# Patient Record
Sex: MALE | Race: ASIAN | HISPANIC OR LATINO | URBAN - METROPOLITAN AREA
[De-identification: names, ages, dates, MRNs, and addresses within clinical notes are randomized per-mention and may not be internally consistent; named-entity substitution may affect disease eponyms.]

---

## 2019-10-01 ENCOUNTER — EMERGENCY (EMERGENCY)
Facility: HOSPITAL | Age: 44
LOS: 1 days | Discharge: HOME | End: 2019-10-01
Attending: EMERGENCY MEDICINE | Admitting: EMERGENCY MEDICINE
Payer: COMMERCIAL

## 2019-10-01 VITALS
OXYGEN SATURATION: 99 % | TEMPERATURE: 98 F | HEART RATE: 68 BPM | DIASTOLIC BLOOD PRESSURE: 96 MMHG | RESPIRATION RATE: 18 BRPM | SYSTOLIC BLOOD PRESSURE: 148 MMHG

## 2019-10-01 PROCEDURE — 99283 EMERGENCY DEPT VISIT LOW MDM: CPT

## 2019-10-01 NOTE — ED PROVIDER NOTE - NSFOLLOWUPINSTRUCTIONS_ED_ALL_ED_FT
PLEASE FOLLOW UP WITH YOUR PRIMARY CARE PHYSICIAN    RETURN TO THE EMERGENCY ROOM FOR NEW OR WORSENING SYMPTOMS

## 2019-10-01 NOTE — ED ADULT NURSE NOTE - CHPI ED NUR SYMPTOMS NEG
no diarrhea/no abdominal distension/no fever/no vomiting/no nausea/no blood in stool/no burning urination/no chills/no hematuria/no dysuria

## 2019-10-01 NOTE — ED PROVIDER NOTE - ATTENDING CONTRIBUTION TO CARE
44M p/w sudden onset L flank pain x 15 mins that resolved.  No vomiting, fever, dysuria, or hematuria.  No testicular pain or lesions reported. No LOC.  Pain now gone, pt feeling normal.  Normal exam and VS.  Likely a passed kidney stone, unlikely UTI with that presentation, possibly GERD or transient bowel problem as pt reports had eaten chili and felt it burn after defecation prior to the event.  Offered pt to have UA, pt declined after understanding it would not likely change mgmt.  pt reports he can f/u PMD tomorrow.  Advised to drink more fluids and ibuprofen for pain.   Return to Emergency Department for new or worsening symptoms.    VS:  unremarkable    GEN - NAD; well appearing; A+O x3   HEAD - NC/AT     ENT - PEERL, EOMI, mucous membranes  moist , no discharge      NECK: Neck supple, non-tender without lymphadenopathy, no masses, no JVD  PULM - CTA b/l,  symmetric breath sounds  COR -  normal heart sounds    ABD - , ND, NT, soft,  BACK - no CVA tenderness, nontender spine     EXTREMS - no edema, no deformity, warm and well perfused    SKIN - no rash or bruising      NEUROLOGIC - alert, CN 2-12 intact, sensation nl, motor no focal deficit.

## 2019-10-01 NOTE — ED ADULT TRIAGE NOTE - CHIEF COMPLAINT QUOTE
Pt. reports he was working on his water heater and after c/o LLQ pain radiating to left flank, lasting for about 15 minutes and resolved on its' own. Denies n/v/d, fever, chills, dysuria, chest pain. Resting comfortably at present.

## 2019-10-01 NOTE — ED PROVIDER NOTE - CLINICAL SUMMARY MEDICAL DECISION MAKING FREE TEXT BOX
Pt PMhx HLD, p/w episode of LLQ pain, began when pt was working on water heater in home, pt describes sharp stabbing pain in LLQ, felt better after pt laid on stomach, now completely resolved, no pain at rest or exertion or on palpation. No associated n/v/d, no recent f/c. Ddx gas pain, possible pinched hernia that has since retracted, passed kidney stone. Given benign exam and no symptoms, no indication for labs or imaging at this time. Pt stable for dc and reassurance -Zenon

## 2019-10-01 NOTE — ED PROVIDER NOTE - OBJECTIVE STATEMENT
44y m PMhx HLD pw episode of LLQ pain. Pt describes acute onset of LLQ pain that began while he was working on his water heater. It was very severe, but he said he laid on his stomach and this helped with his pain. All pain resolved w/in 15 min. Pt now asymptomatic. No associated f/c/n/v/d. No hx of similar attacks in past. Pt appears comfortable now.

## 2019-10-01 NOTE — ED PROVIDER NOTE - PATIENT PORTAL LINK FT
You can access the FollowMyHealth Patient Portal offered by [facility] by registering at the following website: [email or web address]. By joining [employer]’s FollowMyHealth portal, you will also be able to view your health information using other applications (apps) compatible with our system.

## 2022-02-07 ENCOUNTER — EMERGENCY (EMERGENCY)
Facility: HOSPITAL | Age: 47
LOS: 1 days | Discharge: HOME | End: 2022-02-07
Attending: EMERGENCY MEDICINE | Admitting: EMERGENCY MEDICINE
Payer: COMMERCIAL

## 2022-02-07 VITALS
TEMPERATURE: 98 F | DIASTOLIC BLOOD PRESSURE: 52 MMHG | SYSTOLIC BLOOD PRESSURE: 110 MMHG | RESPIRATION RATE: 20 BRPM | HEART RATE: 84 BPM | OXYGEN SATURATION: 100 %

## 2022-02-07 PROCEDURE — 93010 ELECTROCARDIOGRAM REPORT: CPT

## 2022-02-07 PROCEDURE — 99285 EMERGENCY DEPT VISIT HI MDM: CPT | Mod: 25

## 2022-02-07 NOTE — ED ADULT TRIAGE NOTE - CHIEF COMPLAINT QUOTE
Pt with abd pain, L sided CP, nausea, SOB, with radiation to back since 2215 tonight. Pt appears uncomfortable in triage. Denies PMHx.

## 2022-02-08 VITALS
DIASTOLIC BLOOD PRESSURE: 83 MMHG | RESPIRATION RATE: 18 BRPM | SYSTOLIC BLOOD PRESSURE: 115 MMHG | OXYGEN SATURATION: 100 % | TEMPERATURE: 98 F | HEART RATE: 63 BPM

## 2022-02-08 LAB
ALBUMIN SERPL ELPH-MCNC: 4.4 G/DL — SIGNIFICANT CHANGE UP (ref 3.3–5)
ALP SERPL-CCNC: 72 U/L — SIGNIFICANT CHANGE UP (ref 40–120)
ALT FLD-CCNC: 58 U/L — HIGH (ref 4–41)
ANION GAP SERPL CALC-SCNC: 12 MMOL/L — SIGNIFICANT CHANGE UP (ref 7–14)
ANION GAP SERPL CALC-SCNC: 14 MMOL/L — SIGNIFICANT CHANGE UP (ref 7–14)
APTT BLD: 36.6 SEC — HIGH (ref 27–36.3)
AST SERPL-CCNC: 97 U/L — HIGH (ref 4–40)
BILIRUB SERPL-MCNC: 0.6 MG/DL — SIGNIFICANT CHANGE UP (ref 0.2–1.2)
BLD GP AB SCN SERPL QL: NEGATIVE — SIGNIFICANT CHANGE UP
BUN SERPL-MCNC: 18 MG/DL — SIGNIFICANT CHANGE UP (ref 7–23)
BUN SERPL-MCNC: 21 MG/DL — SIGNIFICANT CHANGE UP (ref 7–23)
CALCIUM SERPL-MCNC: 8.6 MG/DL — SIGNIFICANT CHANGE UP (ref 8.4–10.5)
CALCIUM SERPL-MCNC: 9.2 MG/DL — SIGNIFICANT CHANGE UP (ref 8.4–10.5)
CHLORIDE SERPL-SCNC: 100 MMOL/L — SIGNIFICANT CHANGE UP (ref 98–107)
CHLORIDE SERPL-SCNC: 105 MMOL/L — SIGNIFICANT CHANGE UP (ref 98–107)
CO2 SERPL-SCNC: 22 MMOL/L — SIGNIFICANT CHANGE UP (ref 22–31)
CO2 SERPL-SCNC: 23 MMOL/L — SIGNIFICANT CHANGE UP (ref 22–31)
CREAT SERPL-MCNC: 1.02 MG/DL — SIGNIFICANT CHANGE UP (ref 0.5–1.3)
CREAT SERPL-MCNC: 1.12 MG/DL — SIGNIFICANT CHANGE UP (ref 0.5–1.3)
GLUCOSE SERPL-MCNC: 119 MG/DL — HIGH (ref 70–99)
GLUCOSE SERPL-MCNC: 136 MG/DL — HIGH (ref 70–99)
HCT VFR BLD CALC: 43.7 % — SIGNIFICANT CHANGE UP (ref 39–50)
HGB BLD-MCNC: 15.7 G/DL — SIGNIFICANT CHANGE UP (ref 13–17)
INR BLD: 1.05 RATIO — SIGNIFICANT CHANGE UP (ref 0.88–1.16)
LIDOCAIN IGE QN: 56 U/L — SIGNIFICANT CHANGE UP (ref 7–60)
MAGNESIUM SERPL-MCNC: 2.1 MG/DL — SIGNIFICANT CHANGE UP (ref 1.6–2.6)
MCHC RBC-ENTMCNC: 32.5 PG — SIGNIFICANT CHANGE UP (ref 27–34)
MCHC RBC-ENTMCNC: 35.9 GM/DL — SIGNIFICANT CHANGE UP (ref 32–36)
MCV RBC AUTO: 90.5 FL — SIGNIFICANT CHANGE UP (ref 80–100)
NRBC # BLD: 0 /100 WBCS — SIGNIFICANT CHANGE UP
NRBC # FLD: 0 K/UL — SIGNIFICANT CHANGE UP
PHOSPHATE SERPL-MCNC: 3.2 MG/DL — SIGNIFICANT CHANGE UP (ref 2.5–4.5)
PLATELET # BLD AUTO: 326 K/UL — SIGNIFICANT CHANGE UP (ref 150–400)
POTASSIUM SERPL-MCNC: 4.1 MMOL/L — SIGNIFICANT CHANGE UP (ref 3.5–5.3)
POTASSIUM SERPL-MCNC: 5.5 MMOL/L — HIGH (ref 3.5–5.3)
POTASSIUM SERPL-SCNC: 4.1 MMOL/L — SIGNIFICANT CHANGE UP (ref 3.5–5.3)
POTASSIUM SERPL-SCNC: 5.5 MMOL/L — HIGH (ref 3.5–5.3)
PROT SERPL-MCNC: 7.6 G/DL — SIGNIFICANT CHANGE UP (ref 6–8.3)
PROTHROM AB SERPL-ACNC: 11.9 SEC — SIGNIFICANT CHANGE UP (ref 10.6–13.6)
RBC # BLD: 4.83 M/UL — SIGNIFICANT CHANGE UP (ref 4.2–5.8)
RBC # FLD: 12.1 % — SIGNIFICANT CHANGE UP (ref 10.3–14.5)
RH IG SCN BLD-IMP: POSITIVE — SIGNIFICANT CHANGE UP
SARS-COV-2 RNA SPEC QL NAA+PROBE: SIGNIFICANT CHANGE UP
SODIUM SERPL-SCNC: 137 MMOL/L — SIGNIFICANT CHANGE UP (ref 135–145)
SODIUM SERPL-SCNC: 139 MMOL/L — SIGNIFICANT CHANGE UP (ref 135–145)
TROPONIN T, HIGH SENSITIVITY RESULT: <6 NG/L — SIGNIFICANT CHANGE UP
WBC # BLD: 19.1 K/UL — HIGH (ref 3.8–10.5)
WBC # FLD AUTO: 19.1 K/UL — HIGH (ref 3.8–10.5)

## 2022-02-08 PROCEDURE — 74177 CT ABD & PELVIS W/CONTRAST: CPT | Mod: 26,ME

## 2022-02-08 PROCEDURE — 99220: CPT | Mod: 25

## 2022-02-08 PROCEDURE — 99283 EMERGENCY DEPT VISIT LOW MDM: CPT | Mod: GC

## 2022-02-08 PROCEDURE — G1004: CPT

## 2022-02-08 PROCEDURE — 76705 ECHO EXAM OF ABDOMEN: CPT | Mod: 26

## 2022-02-08 PROCEDURE — 78226 HEPATOBILIARY SYSTEM IMAGING: CPT | Mod: 26,GC,MA

## 2022-02-08 PROCEDURE — 71045 X-RAY EXAM CHEST 1 VIEW: CPT | Mod: 26

## 2022-02-08 RX ORDER — FAMOTIDINE 10 MG/ML
20 INJECTION INTRAVENOUS DAILY
Refills: 0 | Status: DISCONTINUED | OUTPATIENT
Start: 2022-02-08 | End: 2022-02-08

## 2022-02-08 RX ORDER — FAMOTIDINE 10 MG/ML
20 INJECTION INTRAVENOUS ONCE
Refills: 0 | Status: COMPLETED | OUTPATIENT
Start: 2022-02-08 | End: 2022-02-08

## 2022-02-08 RX ORDER — MORPHINE SULFATE 50 MG/1
4 CAPSULE, EXTENDED RELEASE ORAL ONCE
Refills: 0 | Status: DISCONTINUED | OUTPATIENT
Start: 2022-02-07 | End: 2022-02-07

## 2022-02-08 RX ORDER — SODIUM CHLORIDE 9 MG/ML
1000 INJECTION INTRAMUSCULAR; INTRAVENOUS; SUBCUTANEOUS
Refills: 0 | Status: DISCONTINUED | OUTPATIENT
Start: 2022-02-08 | End: 2022-02-11

## 2022-02-08 RX ORDER — SODIUM CHLORIDE 9 MG/ML
1000 INJECTION INTRAMUSCULAR; INTRAVENOUS; SUBCUTANEOUS ONCE
Refills: 0 | Status: COMPLETED | OUTPATIENT
Start: 2022-02-08 | End: 2022-02-08

## 2022-02-08 RX ADMIN — Medication 30 MILLILITER(S): at 00:53

## 2022-02-08 RX ADMIN — FAMOTIDINE 20 MILLIGRAM(S): 10 INJECTION INTRAVENOUS at 00:53

## 2022-02-08 RX ADMIN — MORPHINE SULFATE 4 MILLIGRAM(S): 50 CAPSULE, EXTENDED RELEASE ORAL at 00:31

## 2022-02-08 RX ADMIN — SODIUM CHLORIDE 1000 MILLILITER(S): 9 INJECTION INTRAMUSCULAR; INTRAVENOUS; SUBCUTANEOUS at 00:53

## 2022-02-08 RX ADMIN — SODIUM CHLORIDE 125 MILLILITER(S): 9 INJECTION INTRAMUSCULAR; INTRAVENOUS; SUBCUTANEOUS at 12:10

## 2022-02-08 NOTE — ED CDU PROVIDER INITIAL DAY NOTE - MEDICAL DECISION MAKING DETAILS
-HIDA scan r/o acute cholecystitis  -PO challenge to r/o symptomatic gallstones after HIDA  -Gastritis continue H2 blockers upon discharge and will need GI f/u

## 2022-02-08 NOTE — ED PROVIDER NOTE - PROGRESS NOTE DETAILS
Pain resolved. Abdomen soft NTND. JACKIE Radha - surg consulted for possible acute chata. ct scan preformed. radiology recc HIDA scan if clinical concern for chata. cdu contacted for placement in OBS for HIDA scan. Awaiting CDU to see the patient. pt is pain free with non tender soft abdomen.

## 2022-02-08 NOTE — ED CDU PROVIDER DISPOSITION NOTE - CLINICAL COURSE
46 year old male with PMH of HLD presented to the ED with epigastric pain since last night. Denies associated nausea, vomiting, melena, hematochezia, diarrhea, constipation, chest pain, dyspnea, fevers and chills. On exam, abdomen soft and tender to epigastric and RUQ, no rebound. In ED, HD stable, labs reviewed, WBC 19k, Trop <6 x2, lipase 56, Bili 0.6. CT shows "Partially contracted gallbladder containing gallstones. Gallbladder wall thickening or edema, difficult to evaluate secondary to partial decompression". RUQ sono shows "Cholelithiasis and adenomyomatosis. No sonographic evidence of acute cholecystitis". Pt was seen and evaluated by Surgery, recs for HIDA scan. HIDA scan shows "Normal hepatobiliary scan. No radionuclide evidence of acute cholecystitis." Pt reassessed, states he feels better. Abdomen soft nontender. Pt ate and tolerated oral intake well. Seen by surgery and no acute surgical intervention is recommended. Pt to follow up as outpatient GI and PMD. Strict return precautions.

## 2022-02-08 NOTE — ED PROVIDER NOTE - PHYSICAL EXAMINATION
CONSTITUTIONAL: appears uncomfortable  HEENT: head atraumatic; normal cephalic shape; no conjunctivitis or scleral icterus; EOMI; neck supple w/ FROM  CARDIAC: regular rate & rhythm; normal S1, S2; no murmurs, rubs or gallops.  RESPIRATORY: breath sounds clear to auscultation bilaterally; no distress present, no crackles, wheezes, rales, rhonchi, retractions, or tachypnea; normal rate and effort.  GASTROINTESTINAL: abd diffusely tender to palpation in all four quadrants; no guarding or rigidity; no organomegaly or masses; no HSM appreciated; normoactive bowel sounds.  SKIN: cap refill brisk; skin warm, dry and intact; no evidence of rash.  BACK: no vertebral or paraspinal tenderness along entire spine; no CVAT.  MSK: no joint effusion or tenderness; FROM of all joints; no deformities or erythema noted; 2+ peripheral pulses.  NEURO: alert; interactive; no focal deficits.

## 2022-02-08 NOTE — ED PROVIDER NOTE - ATTENDING CONTRIBUTION TO CARE
Afebrile. Awake and Alert. Lungs CTA. Heart RRR. Abdomen soft, non-focal TTP, ND. CN II-XII grossly intact. Moves all extremities without lateralization.    h/o gallstones  r/o acute chata  r/o pancreatitis

## 2022-02-08 NOTE — ED PROVIDER NOTE - CLINICAL SUMMARY MEDICAL DECISION MAKING FREE TEXT BOX
47 yo M hx of HLD p/w acute onset abd pain i/s/o recent dx of GB stones and treated with 10 day course amoxicillin. Ddx including acute chata vs pancreatitis vs dissection. Initial plan for us abd.

## 2022-02-08 NOTE — CONSULT NOTE ADULT - ASSESSMENT
46M w hx of PUD, recently dx cholelithiasis, presents with epigastric pain x 1 day, with US showing cholelithiasis, no acute cholecystitis    Recommendation:  - Patient clinical presentation and US finding less consistent with acute cholecystitis  - follow up CT to evaluate for source of leukocytosis  - recommend GI consult for evaluation for gastritis, PUD  - final plan pending above  - discussed with attending Dr. Evelio MIN team surgery  [id number]

## 2022-02-08 NOTE — ED ADULT NURSE NOTE - NSICDXPASTMEDICALHX_GEN_ALL_CORE_FT
PAST MEDICAL HISTORY:  Gallstones 1/2022 treated with amoxicillin 10 days at [facility] (hyperlipidemia)     Peptic ulcer disease

## 2022-02-08 NOTE — ED ADULT NURSE REASSESSMENT NOTE - NS ED NURSE REASSESS COMMENT FT1
report taken from [facility]. pt resting comfortably in bed, denies complaints at this time. pt transported to [facility] at this time, in NAD, respirations even and unlabored. awaiting return.

## 2022-02-08 NOTE — ED CDU PROVIDER INITIAL DAY NOTE - NSICDXPASTMEDICALHX_GEN_ALL_CORE_FT
PAST MEDICAL HISTORY:  Gallstones 1/2022 treated with amoxicillin 10 days at [facility] (hyperlipidemia)     Peptic ulcer disease

## 2022-02-08 NOTE — ED PROVIDER NOTE - NSICDXFAMILYHX_GEN_ALL_CORE_FT
FAMILY HISTORY:  Family history of diabetes mellitus (DM)  FH: HTN (hypertension)  FH: hyperlipidemia

## 2022-02-08 NOTE — ED PROVIDER NOTE - OBJECTIVE STATEMENT
Pt is a 47 yo man with hx of HLD presenting with severe abdominal/chest pain for several hours. Pt reports that pain started around 22:15 this evening after dinner which started in the back and then became 10/10 diffuse burning pain throughout abd and chest. Endorses nausea during episode. Last BM was after dinner; states it was dark, formed stool. Also states that 2 weeks ago went to [facility] for abd pain, found to have gallstones, sent home with 10 day course of amoxicillin. Denies SOB, vomiting, diarrhea, constipation.

## 2022-02-08 NOTE — ED CDU PROVIDER INITIAL DAY NOTE - OBJECTIVE STATEMENT
Initial ED provider note: "Pt is a 47 yo man with hx of HLD presenting with severe abdominal/chest pain for several hours. Pt reports that pain started around 22:15 this evening after dinner which started in the back and then became 10/10 diffuse burning pain throughout abd and chest. Endorses nausea during episode. Last BM was after dinner; states it was dark, formed stool. Also states that 2 weeks ago went to [facility] for abd pain, found to have gallstones, sent home with 10 day course of amoxicillin. Denies SOB, vomiting, diarrhea, constipation."    Agree with above. 46 year old male with PMH of HLD presented to the ED with epigastric pain since last night. Denies associated nausea, vomiting, melena, hematochezia, diarrhea, constipation, chest pain, dyspnea, fevers and chills. On exam, abdomen soft and tender to epigastric and RUQ, no rebound. In ED, HD stable, labs reviewed, WBC 19k, Trop <6 x2, lipase 56, Bili 0.6. CT shows "Partially contracted gallbladder containing gallstones. Gallbladder wall thickening or edema, difficult to evaluate secondary to partial decompression". RUQ sono shows "Cholelithiasis and adenomyomatosis. No sonographic evidence of acute cholecystitis". Pt was seen and evaluated by Surgery, recs for HIDA scan. CDU plan for HIDA scan, analgesics, IV hydration, f/u surg consult, reassess.

## 2022-02-08 NOTE — ED ADULT NURSE NOTE - OBJECTIVE STATEMENT
47 y/o, a&ox4, received to rm 9 with c/o pain. Pt reports 10/10, constant pain located to abdomen, chest, and back region. Pt reports recent diagnosis of gallstones two weeks ago, no interventions at that time, however, pain started while eating dinner last night. 20G IV placed to posterior left forearm, labs collected and sent off. Pt reports pain relief after receiving medication. Respirations are even and unlabored, no signs of respiratory distress.

## 2022-02-08 NOTE — CONSULT NOTE ADULT - SUBJECTIVE AND OBJECTIVE BOX
GENERAL SURGERY CONSULT NOTE    Patient is a 46y old  Male who presents with a chief complaint of abdominal pain    HPI:  46 year old male w hx of peptic ulcer disease, recently dx cholelithiasis, presents with epigastric pain x 1. Reports having chicken 1 hour prior, no new food. Patient reports burning sensation initially in b/l mid abdomen, and later epigastric and chest. Denies RUQ pain. Reports burning pain similar to previous episode of peptic ulcer diagnosed on upper endoscopy 3 years ago, prescribed ?Nexium. Of note, patient diagnosed with cholelithiasis at [facility] 2 weeks ago after abdominal pain, also found to have leukocytosis of 12. He was discharged on Amoxicillin for 10days. Pain this time was different from pain 2 weeks ago, more similar to peptic ulcer episode. Patient reports using NSAID only 2 times in the past few days. Unsure if he was diagnosed with H. pylori before.     In ED, patient hemodynamically stable. Labs significant for leukocytosis 19. US shows cholelithiasis and adenomyomatosis, no acute cholecystitis. Given morphine, maalox and pepcid in ED, reports improvement with burning sensation.    10-points review of system performed with pertinent negative and positive findings documented in the HPI     PAST MEDICAL & SURGICAL HISTORY:  HLD (hyperlipidemia)    Gallstones  1/2022 treated with amoxicillin 10 days at [facility]    Peptic ulcer disease    No significant past surgical history    FAMILY HISTORY:  Family history of diabetes mellitus (DM)    FH: HTN (hypertension)    FH: hyperlipidemia    : Family history not pertinent as reviewed with the patient and family    SOCIAL HISTORY: No pertinent social history    MEDICATIONS  (STANDING):    MEDICATIONS  (PRN):  aluminum hydroxide/magnesium hydroxide/simethicone Suspension 30 milliLiter(s) Oral every 4 hours PRN Dyspepsia    Allergies    No Known Allergies    Intolerances    Vital Signs Last 24 Hrs  T(C): 36.7 (08 Feb 2022 04:02), Max: 36.7 (07 Feb 2022 23:05)  T(F): 98.1 (08 Feb 2022 04:02), Max: 98.1 (07 Feb 2022 23:05)  HR: 90 (08 Feb 2022 04:02) (84 - 99)  BP: 141/90 (08 Feb 2022 04:02) (110/52 - 141/90)  BP(mean): --  RR: 18 (08 Feb 2022 04:02) (18 - 20)  SpO2: 99% (08 Feb 2022 04:02) (99% - 100%)  Daily     Daily     Exam:  General: resting comfortably, NAD  Resp: nonlabored breathing  Abd: soft, NT, ND; no R/G  Ext: WWP  Neuro: AAOx3                        15.7   19.10 )-----------( 326      ( 08 Feb 2022 00:42 )             43.7     02-08    139  |  105  |  18  ----------------------------<  119<H>  4.1   |  22  |  1.02    Ca    8.6      08 Feb 2022 03:12  Phos  3.2     02-08  Mg     2.10     02-08    TPro  7.6  /  Alb  4.4  /  TBili  0.6  /  DBili  x   /  AST  97<H>  /  ALT  58<H>  /  AlkPhos  72  02-08    PT/INR - ( 08 Feb 2022 01:15 )   PT: 11.9 sec;   INR: 1.05 ratio         PTT - ( 08 Feb 2022 01:15 )  PTT:36.6 sec      IMAGING STUDIES:    < from: US Abdomen Upper Quadrant Right (02.08.22 @ 01:59) >  FINDINGS:    Liver: Within normal limits.  Bile ducts: Normal caliber. Common bile duct measures 3 mm.  Gallbladder: Cholelithiasis. Punctate echogenic foci along the dependent   gallbladder wall showing comet artifact posteriorly. The gallbladder wall   is mildly thickened to 4 mm. No pericholecystic fluid. Negative   sonographic Royal sign (pain medications were administered).  Pancreas: Visualized portions are within normal limits.  Right kidney: 11.1 cm. No hydronephrosis.  IVC: Visualized portions are within normal limits.    IMPRESSION:    Cholelithiasis and adenomyomatosis.  No sonographic evidence of acute cholecystitis.    < end of copied text >

## 2022-02-08 NOTE — CHART NOTE - NSCHARTNOTEFT_GEN_A_CORE
Results of HIDA scan below noted. No surgical intervention indicated given no evidence of acute cholecystitis. Please re-page surgery if clinical condition changes    Carolyn Abernathy MD  PGY2 Consult Resident  B Team Surgery (Acute Care Surgery)  [id number]        < from: NM Hepatobiliary Imaging (02.08.22 @ 10:55) >      FINDINGS: There is prompt, homogeneous uptake of radiopharmaceutical by   the hepatocytes. Activity is first seen in the gallbladder at about 20   minutes and in the bowel at about 75 minutes. There is good clearance of   activity from the liver by the end of the study.    IMPRESSION: Normal hepatobiliary scan. No radionuclide evidence of acute   cholecystitis.    --- End of Report ---    < end of copied text >

## 2022-02-08 NOTE — ED PROVIDER NOTE - NSICDXPASTMEDICALHX_GEN_ALL_CORE_FT
PAST MEDICAL HISTORY:  Gallstones 1/2022 treated with amoxicillin 10 days at [facility] (hyperlipidemia)      PAST MEDICAL HISTORY:  Gallstones 1/2022 treated with amoxicillin 10 days at [facility] (hyperlipidemia)     Peptic ulcer disease

## 2022-02-08 NOTE — ED CDU PROVIDER INITIAL DAY NOTE - PROGRESS NOTE DETAILS
HIDA scan shows "Normal hepatobiliary scan. No radionuclide evidence of acute cholecystitis." Pt reassessed, states he feels better. Abdomen soft nontender. Pt ate and tolerated oral intake well. Seen by surgery and no acute surgical intervention is recommended. Pt to follow up as outpatient GI and PMD. Strict return precautions.

## 2022-02-08 NOTE — ED CDU PROVIDER DISPOSITION NOTE - NSFOLLOWUPINSTRUCTIONS_ED_ALL_ED_FT
Follow up with your PMD within 24-48 hrs. Show copies of your reports given to you. Take all of your medications as previously prescribed.    If you have any new, worsened or concerning symptoms, please return to the emergency department immediately.    See referral attached for follow up with gastroenterology as soon as possible.       Gallstones    WHAT YOU NEED TO KNOW:    What are gallstones? Gallstones are hard substances that form in your gallbladder or bile duct. Your gallbladder and bile duct are located on the right side of your abdomen, near your liver. Your gallbladder stores bile. Bile helps break down the fat that you eat. Your gallbladder also helps remove certain chemicals from your body.      What causes gallstones? Gallstones develop when your gallbladder does not empty correctly. Stones can form from different bile materials. The following may increase your risk:   •Obesity or not enough physical activity      •Pregnancy      •A family history of gallstones      •A health condition such as diabetes, cirrhosis, or nonalcoholic fatty liver disease      •Rapid weight loss      •Surgery on your intestines      •Certain medicines, such as estrogen, antibiotics, and cholesterol-lowering medicines      What are the signs and symptoms of gallstones? The most common symptom is severe, constant pain in the right upper abdomen. It is usually just below the ribcage. The pain may also be felt in the right shoulder and between the shoulder blades. You may also have any of the following:   •Nausea and vomiting      •Feeling bloated      •Ziggy-colored bowel movements      •Dark urine      How are gallstones diagnosed? Ultrasound pictures may be used to check for gallstones. If these tests do not show clear signs of gallstones, you may need a test that uses contrast liquid. Examples include a gallbladder scan, endoscopic retrograde cholangiopancreatography (ERCP), and an oral cholecystography. For any of these tests, tell your healthcare provider if you have ever had an allergic reaction to contrast liquid. If you are a woman, tell your healthcare provider if there is a chance you may be pregnant.    How are gallstones treated? You may not need treatment if you do not have signs or symptoms. Your healthcare provider may want to monitor the gallstones over time. You may need any of the following:   •Antinausea medicine may be given to calm your stomach and to help prevent vomiting.      •Prescription pain medicine may be given. Ask your healthcare provider how to take this medicine safely. Some prescription pain medicines contain acetaminophen. Do not take other medicines that contain acetaminophen without talking to your healthcare provider. Too much acetaminophen may cause liver damage. Prescription pain medicine may cause constipation. Ask your healthcare provider how to prevent or treat constipation.       •Surgery may be needed to remove your gallbladder. This may be done after symptoms become severe or you develop health problems from the gallstones. Your healthcare provider may recommend gallbladder removal before you develop symptoms. This may be done if you are at high risk for gallstones or health problems they can cause.      What can I do to manage or prevent gallstones?   •Eat a variety of healthy foods. This may help you have more energy and heal faster. Healthy foods include fruits, vegetables, whole-grain breads, low-fat dairy products, beans, lean meat, and fish. Ask if you need to be on a special diet. Try to eat regular meals during the day. This will help your gallbladder empty.      •Exercise as directed. Talk to your healthcare provider about the best exercise plan for you. Exercise can help you lose weight and improve your health.      •Manage your weight. If you are overweight, it is important to reach a healthy weight. You will need to lose weight slowly because rapid weight loss can increase your risk for gallstones. Talk to your healthcare provider about your weight. He or she can help you create a safe weight loss plan if you need to lose weight.      When should I seek immediate care?   •You have a fever and chills.      •Your eyes or skin turn yellow.      •You have severe pain in your upper abdomen, just below the right ribcage.      When should I contact my healthcare provider?   •You have nausea and are vomiting.      •Your urine is dark.      •You have ziggy-colored bowel movements.      •You have questions or concerns about your condition or care.

## 2022-02-08 NOTE — ED PROVIDER NOTE - NS ED ROS FT
GENERAL: no fever, chills  HEENT: no throat pain, cough, congestion, dysphagia  CARDIAC: +chest pain; no palpitations  PULM: no shortness of breath, cough, wheezing   GI: +abd pain, nausea; no vomiting, diarrhea, constipation  : no dysuria, frequency  NEURO: no headache, lightheadedness  MSK: no joint or muscle pain  SKIN: no rashes, no ulcers  HEME: no active bleeding, no supraclavicular LAD

## 2022-02-08 NOTE — CONSULT NOTE ADULT - ATTENDING COMMENTS
Pt seen and examined.  Agree with resident eval and plan.  Imp:  Peptic ulcer disease with hx of gastric ulcer with incidental cholelithiasis.  No evidence of acute cholecystitis with nontender exam and contracted gallbladder on US and CT.  Recommend PPIs and followup with GI for EGD

## 2022-02-08 NOTE — ED CDU PROVIDER DISPOSITION NOTE - PATIENT PORTAL LINK FT
You can access the FollowMyHealth Patient Portal offered by [facility] by registering at the following website: [email or web address]. By joining [employer]’s FollowMyHealth portal, you will also be able to view your health information using other applications (apps) compatible with our system.

## 2022-02-09 ENCOUNTER — NON-APPOINTMENT (OUTPATIENT)
Age: 47
End: 2022-02-09

## 2022-02-09 ENCOUNTER — APPOINTMENT (OUTPATIENT)
Dept: GASTROENTEROLOGY | Facility: CLINIC | Age: 47
End: 2022-02-09
Payer: COMMERCIAL

## 2022-02-09 VITALS
OXYGEN SATURATION: 98 % | HEIGHT: 63 IN | BODY MASS INDEX: 24.8 KG/M2 | DIASTOLIC BLOOD PRESSURE: 82 MMHG | SYSTOLIC BLOOD PRESSURE: 140 MMHG | WEIGHT: 140 LBS | HEART RATE: 93 BPM

## 2022-02-09 DIAGNOSIS — K27.9 PEPTIC ULCER, SITE UNSPECIFIED, UNSPECIFIED AS ACUTE OR CHRONIC, W/OUT HEMORRHAGE OR PERFORATION: ICD-10-CM

## 2022-02-09 PROBLEM — E78.5 HYPERLIPIDEMIA, UNSPECIFIED: Chronic | Status: ACTIVE | Noted: 2022-02-08

## 2022-02-09 PROBLEM — Z00.00 ENCOUNTER FOR PREVENTIVE HEALTH EXAMINATION: Status: ACTIVE | Noted: 2022-02-09

## 2022-02-09 PROBLEM — K80.20 CALCULUS OF GALLBLADDER WITHOUT CHOLECYSTITIS WITHOUT OBSTRUCTION: Chronic | Status: ACTIVE | Noted: 2022-02-08

## 2022-02-09 PROCEDURE — 99204 OFFICE O/P NEW MOD 45 MIN: CPT

## 2022-02-09 NOTE — ADDENDUM
[FreeTextEntry1] : The risks and benefits of my recommendations, as well as other treatment options were discussed with the patient today. Questions were answered.\par \par Please feel free to contact for any questions or concerns at my office  in the telephone numbers listed below.\par \par [address], [address], [place], [place], [address] Telephone: [phone] Fax: [phone]\par \par \par

## 2022-02-09 NOTE — PHYSICAL EXAM
[General Appearance - Alert] : alert [General Appearance - In No Acute Distress] : in no acute distress [Sclera] : the sclera and conjunctiva were normal [PERRL With Normal Accommodation] : pupils were equal in size, round, and reactive to light [Extraocular Movements] : extraocular movements were intact [Outer Ear] : the ears and nose were normal in appearance [Oropharynx] : the oropharynx was normal [Neck Appearance] : the appearance of the neck was normal [Neck Cervical Mass (___cm)] : no neck mass was observed [Jugular Venous Distention Increased] : there was no jugular-venous distention [Thyroid Diffuse Enlargement] : the thyroid was not enlarged [Thyroid Nodule] : there were no palpable thyroid nodules [] : no respiratory distress [Auscultation Breath Sounds / Voice Sounds] : lungs were clear to auscultation bilaterally [Heart Rate And Rhythm] : heart rate was normal and rhythm regular [Heart Sounds] : normal S1 and S2 [Heart Sounds Gallop] : no gallops [Murmurs] : no murmurs [Heart Sounds Pericardial Friction Rub] : no pericardial rub [Full Pulse] : the pedal pulses are present [Edema] : there was no peripheral edema [Normal] : normal [Soft, Nontender] : the abdomen was soft and nontender [No Mass] : no masses were palpated [No HSM] : no hepatosplenomegaly noted

## 2022-02-09 NOTE — CONSULT LETTER
[Dear  ___] : Dear  [unfilled], [Consult Letter:] : I had the pleasure of evaluating your patient, [unfilled]. [Please see my note below.] : Please see my note below. [Consult Closing:] : Thank you very much for allowing me to participate in the care of this patient.  If you have any questions, please do not hesitate to contact me. [Sincerely,] : Sincerely, [FreeTextEntry3] : Jose Prescott MD\par \par Assistant Clinical Professor \par Division of Gastroenterology at [facility]\par [facility]\par [facility] at [facility]\par [facility]\par \par [address], [facility] [facility] [address], [place] [address]\par Tel: [phone] | Fax: [phone]\par \par Twitter (Personal): @[email or web address] \par \par \par

## 2022-02-09 NOTE — ASSESSMENT
[FreeTextEntry1] : JEFFERSON REYES 46 year M with HLD, Gallstones and PUD x 5 years, here for epigastric abdominal pain for 3 days. \par \par 1. Epigastric pain\par - I will schedule an endoscopy to exclude PUD.\par - I advised him to take omeprazole 40 mg daily.\par \par 2. Gallstones\par - I will schedule a MRCP to exclude CBD stones.\par - If positive, will refer to advanced GI for an EUS/ERCP.\par \par Follow up in 2 months.

## 2022-02-09 NOTE — HISTORY OF PRESENT ILLNESS
[Heartburn] : denies heartburn [Nausea] : denies nausea [Vomiting] : denies vomiting [Diarrhea] : denies diarrhea [Constipation] : denies constipation [Yellow Skin Or Eyes (Jaundice)] : denies jaundice [Abdominal Swelling] : denies abdominal swelling [Rectal Pain] : denies rectal pain [Abdominal Pain] : abdominal pain [Peptic Ulcer Disease] : peptic ulcer disease [Cholelithiasis] : cholelithiasis [Wt Gain ___ Lbs] : no recent weight gain [Wt Loss ___ Lbs] : no recent weight loss [GERD] : no gastroesophageal reflux disease [Hiatus Hernia] : no hiatus hernia [Pancreatitis] : no pancreatitis [Kidney Stone] : no kidney stone [Inflammatory Bowel Disease] : no inflammatory bowel disease [Irritable Bowel Syndrome] : no irritable bowel syndrome [Diverticulitis] : no diverticulitis [Alcohol Abuse] : no alcohol abuse [Malignancy] : no malignancy [Abdominal Surgery] : no abdominal surgery [Appendectomy] : no appendectomy [Cholecystectomy] : no cholecystectomy [de-identified] : JEFFERSON REYES 46 year M with HLD, Gallstones and PUD x 5 years, here for epigastric abdominal pain for 3 days. \par \par Patient had been twice to ED at [facility] within last two weeks with epigastric pain, 10, 10, burning, similar PUD. Patient reports burning sensation in mid abdomen, and later epigastric and chest. Denies RUQ pain. Reports burning pain similar to previous episode of peptic ulcer diagnosed on upper endoscopy 5 years ago, prescribed omeprazole 40 mg daily. Patient was seen at the ED and an ultrasound scan showed cholelithiasis and adenomyomatosis, no acute cholecystitis.  ACT abdomen showed partially contracted gallbladder containing gallstones. Gallbladder wall  thickening or edema, difficult to evaluate secondary to partial  decompression.  A HIDA scan was normal. Surgery stated no cholecystectomy required. In ED, patient hemodynamically stable. Labs significant for leukocytosis 19.  He was given morphine, Maalox and Pepcid in ED, reports improvement with burning sensation. Advised to take omeprazole at home. \par \par Patient diagnosed with cholelithiasis at [facility], two weeks ago after abdominal pain, also found to have leukocytosis of 12. He was discharged on Amoxicillin for 10 days. Pain this time was different from pain two weeks ago, with more burning pain in epigastric area and not LLQ abdominal pain. \par \par Denies NSAID's use.\par \par Patient states of a good appetite, no loss of weight, bowel movement once daily, formed and brown stools without blood or mucus. Denies nausea, vomiting, melena, hematochezia, or hematemesis.\par   \par FH: No 1st degree or 2nd degree relative with colon cancer, gastric cancer or pancreatic cancer.\par \par \par

## 2022-02-18 ENCOUNTER — NON-APPOINTMENT (OUTPATIENT)
Age: 47
End: 2022-02-18

## 2022-02-21 ENCOUNTER — EMERGENCY (EMERGENCY)
Facility: HOSPITAL | Age: 47
LOS: 1 days | Discharge: HOME | End: 2022-02-21
Attending: STUDENT IN AN ORGANIZED HEALTH CARE EDUCATION/TRAINING PROGRAM | Admitting: STUDENT IN AN ORGANIZED HEALTH CARE EDUCATION/TRAINING PROGRAM
Payer: COMMERCIAL

## 2022-02-21 VITALS
TEMPERATURE: 98 F | OXYGEN SATURATION: 98 % | RESPIRATION RATE: 16 BRPM | HEART RATE: 66 BPM | DIASTOLIC BLOOD PRESSURE: 64 MMHG | SYSTOLIC BLOOD PRESSURE: 127 MMHG

## 2022-02-21 VITALS — SYSTOLIC BLOOD PRESSURE: 138 MMHG | HEART RATE: 81 BPM | DIASTOLIC BLOOD PRESSURE: 76 MMHG | RESPIRATION RATE: 14 BRPM

## 2022-02-21 LAB
ALBUMIN SERPL ELPH-MCNC: 4.6 G/DL — SIGNIFICANT CHANGE UP (ref 3.3–5)
ALP SERPL-CCNC: 90 U/L — SIGNIFICANT CHANGE UP (ref 40–120)
ALT FLD-CCNC: 47 U/L — HIGH (ref 4–41)
ANION GAP SERPL CALC-SCNC: 15 MMOL/L — HIGH (ref 7–14)
AST SERPL-CCNC: 73 U/L — HIGH (ref 4–40)
BASOPHILS # BLD AUTO: 0.04 K/UL — SIGNIFICANT CHANGE UP (ref 0–0.2)
BASOPHILS NFR BLD AUTO: 0.3 % — SIGNIFICANT CHANGE UP (ref 0–2)
BILIRUB SERPL-MCNC: 0.4 MG/DL — SIGNIFICANT CHANGE UP (ref 0.2–1.2)
BLOOD GAS VENOUS COMPREHENSIVE RESULT: SIGNIFICANT CHANGE UP
BUN SERPL-MCNC: 16 MG/DL — SIGNIFICANT CHANGE UP (ref 7–23)
CALCIUM SERPL-MCNC: 9.4 MG/DL — SIGNIFICANT CHANGE UP (ref 8.4–10.5)
CHLORIDE SERPL-SCNC: 100 MMOL/L — SIGNIFICANT CHANGE UP (ref 98–107)
CO2 SERPL-SCNC: 20 MMOL/L — LOW (ref 22–31)
CREAT SERPL-MCNC: 0.89 MG/DL — SIGNIFICANT CHANGE UP (ref 0.5–1.3)
EOSINOPHIL # BLD AUTO: 0.43 K/UL — SIGNIFICANT CHANGE UP (ref 0–0.5)
EOSINOPHIL NFR BLD AUTO: 2.7 % — SIGNIFICANT CHANGE UP (ref 0–6)
FLUAV AG NPH QL: SIGNIFICANT CHANGE UP
FLUBV AG NPH QL: SIGNIFICANT CHANGE UP
GLUCOSE SERPL-MCNC: 134 MG/DL — HIGH (ref 70–99)
HCT VFR BLD CALC: 43.5 % — SIGNIFICANT CHANGE UP (ref 39–50)
HGB BLD-MCNC: 15.8 G/DL — SIGNIFICANT CHANGE UP (ref 13–17)
IANC: 12.74 K/UL — HIGH (ref 1.5–8.5)
IMM GRANULOCYTES NFR BLD AUTO: 0.6 % — SIGNIFICANT CHANGE UP (ref 0–1.5)
LYMPHOCYTES # BLD AUTO: 12.9 % — LOW (ref 13–44)
LYMPHOCYTES # BLD AUTO: 2.06 K/UL — SIGNIFICANT CHANGE UP (ref 1–3.3)
MAGNESIUM SERPL-MCNC: 2 MG/DL — SIGNIFICANT CHANGE UP (ref 1.6–2.6)
MCHC RBC-ENTMCNC: 33.1 PG — SIGNIFICANT CHANGE UP (ref 27–34)
MCHC RBC-ENTMCNC: 36.3 GM/DL — HIGH (ref 32–36)
MCV RBC AUTO: 91.2 FL — SIGNIFICANT CHANGE UP (ref 80–100)
MONOCYTES # BLD AUTO: 0.59 K/UL — SIGNIFICANT CHANGE UP (ref 0–0.9)
MONOCYTES NFR BLD AUTO: 3.7 % — SIGNIFICANT CHANGE UP (ref 2–14)
NEUTROPHILS # BLD AUTO: 12.74 K/UL — HIGH (ref 1.8–7.4)
NEUTROPHILS NFR BLD AUTO: 79.8 % — HIGH (ref 43–77)
NRBC # BLD: 0 /100 WBCS — SIGNIFICANT CHANGE UP
NRBC # FLD: 0 K/UL — SIGNIFICANT CHANGE UP
PHOSPHATE SERPL-MCNC: 3.3 MG/DL — SIGNIFICANT CHANGE UP (ref 2.5–4.5)
PLATELET # BLD AUTO: 287 K/UL — SIGNIFICANT CHANGE UP (ref 150–400)
POTASSIUM SERPL-MCNC: 3.7 MMOL/L — SIGNIFICANT CHANGE UP (ref 3.5–5.3)
POTASSIUM SERPL-SCNC: 3.7 MMOL/L — SIGNIFICANT CHANGE UP (ref 3.5–5.3)
PROT SERPL-MCNC: 7.2 G/DL — SIGNIFICANT CHANGE UP (ref 6–8.3)
RBC # BLD: 4.77 M/UL — SIGNIFICANT CHANGE UP (ref 4.2–5.8)
RBC # FLD: 12.1 % — SIGNIFICANT CHANGE UP (ref 10.3–14.5)
RSV RNA NPH QL NAA+NON-PROBE: SIGNIFICANT CHANGE UP
SARS-COV-2 RNA SPEC QL NAA+PROBE: SIGNIFICANT CHANGE UP
SODIUM SERPL-SCNC: 135 MMOL/L — SIGNIFICANT CHANGE UP (ref 135–145)
TROPONIN T, HIGH SENSITIVITY RESULT: <6 NG/L — SIGNIFICANT CHANGE UP
WBC # BLD: 15.95 K/UL — HIGH (ref 3.8–10.5)
WBC # FLD AUTO: 15.95 K/UL — HIGH (ref 3.8–10.5)

## 2022-02-21 PROCEDURE — 71045 X-RAY EXAM CHEST 1 VIEW: CPT | Mod: 26

## 2022-02-21 PROCEDURE — 76705 ECHO EXAM OF ABDOMEN: CPT | Mod: 26

## 2022-02-21 PROCEDURE — 93010 ELECTROCARDIOGRAM REPORT: CPT

## 2022-02-21 PROCEDURE — 99285 EMERGENCY DEPT VISIT HI MDM: CPT | Mod: 25

## 2022-02-21 RX ORDER — FAMOTIDINE 10 MG/ML
20 INJECTION INTRAVENOUS ONCE
Refills: 0 | Status: COMPLETED | OUTPATIENT
Start: 2022-02-21 | End: 2022-02-21

## 2022-02-21 RX ORDER — OXYCODONE AND ACETAMINOPHEN 5; 325 MG/1; MG/1
1 TABLET ORAL
Qty: 8 | Refills: 0
Start: 2022-02-21 | End: 2022-02-22

## 2022-02-21 RX ORDER — LIDOCAINE 4 G/100G
10 CREAM TOPICAL ONCE
Refills: 0 | Status: COMPLETED | OUTPATIENT
Start: 2022-02-21 | End: 2022-02-21

## 2022-02-21 RX ORDER — MORPHINE SULFATE 50 MG/1
4 CAPSULE, EXTENDED RELEASE ORAL ONCE
Refills: 0 | Status: DISCONTINUED | OUTPATIENT
Start: 2022-02-21 | End: 2022-02-21

## 2022-02-21 RX ORDER — SODIUM CHLORIDE 9 MG/ML
1000 INJECTION INTRAMUSCULAR; INTRAVENOUS; SUBCUTANEOUS ONCE
Refills: 0 | Status: COMPLETED | OUTPATIENT
Start: 2022-02-21 | End: 2022-02-21

## 2022-02-21 RX ADMIN — SODIUM CHLORIDE 1000 MILLILITER(S): 9 INJECTION INTRAMUSCULAR; INTRAVENOUS; SUBCUTANEOUS at 02:22

## 2022-02-21 RX ADMIN — LIDOCAINE 10 MILLILITER(S): 4 CREAM TOPICAL at 02:21

## 2022-02-21 RX ADMIN — FAMOTIDINE 20 MILLIGRAM(S): 10 INJECTION INTRAVENOUS at 02:22

## 2022-02-21 RX ADMIN — MORPHINE SULFATE 4 MILLIGRAM(S): 50 CAPSULE, EXTENDED RELEASE ORAL at 02:22

## 2022-02-21 RX ADMIN — Medication 30 MILLILITER(S): at 02:21

## 2022-02-21 NOTE — ED PROVIDER NOTE - CLINICAL SUMMARY MEDICAL DECISION MAKING FREE TEXT BOX
Mariah PGY3: suspect PUD - low susp acs, EKG nl, will send screening troponin. labs w/ vbg, low susp for perfed ulcer, will treat symptomatically, BRIANA monroe, reass.

## 2022-02-21 NOTE — ED PROVIDER NOTE - PROGRESS NOTE DETAILS
Mariah PGY3: labs/imaging reassuring, pt comfortable. Appears likely PUD. Pt to endoscopy wednesday, educated on minimizing etoh/nsaids etc, pt comfortable with plan at this time. Pt was re-evaluated at bedside, VSS, feeling better overall. Results were discussed with patient as well as return precautions and follow up plan with PCP and/or specialist. Time was taken to answer any questions that the patient had before providing them with discharge paperwork.

## 2022-02-21 NOTE — ED PROVIDER NOTE - NSICDXPASTMEDICALHX_GEN_ALL_CORE_FT
PAST MEDICAL HISTORY:  Gallstones 1/2022 treated with amoxicillin 10 days at [facility] (hyperlipidemia)     Peptic ulcer disease

## 2022-02-21 NOTE — ED PROVIDER NOTE - CONSTITUTIONAL NEGATIVE STATEMENT, MLM
Shreya Rodriguez)  Infectious Disease; Internal Medicine  [address] [place] [address]  Phone: [phone]  Fax: [phone]  Follow Up Time: no fever and no chills.

## 2022-02-21 NOTE — ED PROVIDER NOTE - OBJECTIVE STATEMENT
46M hx PUD, cholelithiasis p/w epigastric pain. Reports pain began last evening approx 5 hours ago. Began several hours after eating dinner. Notes midsternal burning  chest pain w/ associated epigastric pain, radiating to back. Persistent, severe. Denies flank pain, no RUQ pain. Took pepcid at 10 pm, 20mg. Pt collapsed in the waiting room (reports 2/2 pain), no LOC. Of note patient seen in ED several weeks ago for similar symptoms, noted to have cholelithiasis on RUQ sonoTREE neg at that time. Scheduled for endoscopy outpt this week. No nausea/vomiting, no f/c.

## 2022-02-21 NOTE — ED PROVIDER NOTE - PATIENT PORTAL LINK FT
You can access the FollowMyHealth Patient Portal offered by [facility] by registering at the following website: [email or web address]. By joining [employer]’s FollowMyHealth portal, you will also be able to view your health information using other applications (apps) compatible with our system.

## 2022-02-21 NOTE — ED PROVIDER NOTE - NSFOLLOWUPINSTRUCTIONS_ED_ALL_ED_FT
Please follow up with your primary care provider for further concerns you may have regarding your general health. Attached you will find your results from today's visit. Continue taking your medications as prescribed and keep your upcoming medical appointments.    Read the attached handout on peptic ulcer disease and minimize caffeine, acidic foods, alcohol intake, and no NSAIDS. Follow up with your GI doctor on wedensday.

## 2022-02-21 NOTE — ED ADULT NURSE NOTE - NSICDXPASTMEDICALHX_GEN_ALL_CORE_FT
PAST MEDICAL HISTORY:  Gallstones 1/2022 treated with amoxicillin 10 days at [facility] (hyperlipidemia)     Peptic ulcer disease

## 2022-02-21 NOTE — ED ADULT NURSE NOTE - OBJECTIVE STATEMENT
pt complaining of pain to mid abdominal today after eating a salmon dinner. pt took some home pain medications including ibuprofen and felt worse afterwards.

## 2022-02-21 NOTE — ED PROVIDER NOTE - ATTENDING CONTRIBUTION TO CARE
I have personally performed a face to face medical and diagnostic evaluation of the patient. I have discussed with and reviewed the Resident's note and agree with the History, ROS, Physical Exam and MDM unless otherwise indicated. A brief summary of my personal evaluation and impression can be found below.    46M hx of PUD cholelithiases recent [facility] CDU stay with negative HIDA presents with a cc of epispastic pain for approx. 5 hours, paid radiates across abdomen to back and is exactly the same to prior episodes of pain prompting ED visit approx x2 weeks ago. Denies numbness, tingling or loss of sensation. Denies loss of motor function. Pt had episode of collapse in WR. No head strike. Has endoscopy schd'd this week. no nausea vomiting fever chills, +chest pain. No changes in stools, no bleeding.     All other ROS negative, except as above and as per HPI and ROS section.    VITALS: Initial triage and subsequent vitals have been reviewed by me.  GEN APPEARANCE: WDWN, alert, non-toxic, uncomfortable appearing   HEAD: Atraumatic.  EYES: PERRLa, EOMI, vision grossly intact.   NECK: Supple  CV: RRR, S1S2, no c/r/m/g. Cap refill <2 seconds. No bruits.   LUNGS: CTAB. No abnormal breath sounds.  ABDOMEN: Soft, NTND. No guarding or rebound.   MSK/EXT: No spinal or extremity point tenderness. No CVA ttp. Pelvis stable. No peripheral edema.  NEURO: Alert, follows commands. Weight bearing normal. Speech normal. Sensation and motor normal x4 extremities.   SKIN: Warm, dry and intact. No rash.  PSYCH: Appropriate    Plan/MDM: 46M hx of PUD cholelithiases recent [facility] CDU stay with negative HIDA presents with a cc of epispastic pain for approx. 5 hours, paid radiates across abdomen to back and is exactly the same to prior episodes of pain prompting ED visit approx x2 weeks ago exam vss non toxic ddx c/f likely PUD vs chata vs panc less c/f acs as ekg reassuring less c/f dissection as pt reports this is flare of pain he's had before and is exactly the same in presentation. plan to check ekg cxr cardiacs ruq sono meds and reassess.

## 2022-02-21 NOTE — ED PROVIDER NOTE - PHYSICAL EXAMINATION
Gen: agitated, clutching abdomen   HEENT: EOMI, no nasal discharge, mucous membranes moist  CV: sinus tach, +S1/S2, no M/R/G  Resp: CTAB, no W/R/R  GI: Abdomen soft non-distended, ttp epigastrium, no rebound/guard, no RUQ pain   MSK: No open wounds, no bruising, no LE edema  Neuro: A&Ox4, following commands, moving all four extremities spontaneously  Psych: appropriate mood, affect

## 2022-02-23 ENCOUNTER — APPOINTMENT (OUTPATIENT)
Dept: GASTROENTEROLOGY | Facility: HOSPITAL | Age: 47
End: 2022-02-23

## 2022-03-08 ENCOUNTER — APPOINTMENT (OUTPATIENT)
Dept: GASTROENTEROLOGY | Facility: AMBULATORY MEDICAL SERVICES | Age: 47
End: 2022-03-08
Payer: COMMERCIAL

## 2022-03-08 PROCEDURE — 43239 EGD BIOPSY SINGLE/MULTIPLE: CPT

## 2022-03-11 ENCOUNTER — APPOINTMENT (OUTPATIENT)
Dept: GASTROENTEROLOGY | Facility: CLINIC | Age: 47
End: 2022-03-11
Payer: COMMERCIAL

## 2022-03-11 ENCOUNTER — TRANSCRIPTION ENCOUNTER (OUTPATIENT)
Age: 47
End: 2022-03-11

## 2022-03-11 VITALS
TEMPERATURE: 98.7 F | BODY MASS INDEX: 24.8 KG/M2 | DIASTOLIC BLOOD PRESSURE: 82 MMHG | SYSTOLIC BLOOD PRESSURE: 118 MMHG | WEIGHT: 140 LBS | OXYGEN SATURATION: 98 % | HEIGHT: 63 IN | HEART RATE: 89 BPM

## 2022-03-11 DIAGNOSIS — R10.13 EPIGASTRIC PAIN: ICD-10-CM

## 2022-03-11 DIAGNOSIS — K80.20 CALCULUS OF GALLBLADDER W/OUT CHOLECYSTITIS W/OUT OBSTRUCTION: ICD-10-CM

## 2022-03-11 PROCEDURE — 99214 OFFICE O/P EST MOD 30 MIN: CPT

## 2022-03-11 NOTE — ASSESSMENT
[FreeTextEntry1] : JEFFERSON REYES 46 year M with HLD, Gallstones and PUD x 5 years, here for epigastric abdominal pain 2 months.\par \par 1. Epigastric pain\par -An endoscopy showed mild gastritis and reflux.\par - He feels well after starting omeprazole 40 mg daily.\par - Congratulated him on his healthy eating habits. \par \par 2. Gallstones\par - Patient did not undergo a MRCP to exclude CBD stones.\par - I will monitor his symptoms for now and will defer a MRCP.\par \par Follow up in 6 months.

## 2022-03-11 NOTE — CONSULT LETTER
[Dear  ___] : Dear  [unfilled], [Consult Letter:] : I had the pleasure of evaluating your patient, [unfilled]. [Please see my note below.] : Please see my note below. [Consult Closing:] : Thank you very much for allowing me to participate in the care of this patient.  If you have any questions, please do not hesitate to contact me. [Sincerely,] : Sincerely, [FreeTextEntry3] : Jose Prescott MD\par \par Assistant Clinical Professor \par Division of Gastroenterology at [facility]\par [facility]\par Infl[facility] Disease Center at [facility]\par [facility] at [facility]\par \par [address], [address], [place] [address]\par Tel: [phone] | Fax: [phone]\par \par Twitter (Personal): @[email or web address] \par \par \par

## 2022-03-11 NOTE — REASON FOR VISIT
[Consultation] : a consultation visit [Follow-Up: _____] : a [unfilled] follow-up visit [FreeTextEntry1] : Abdominal pain

## 2022-03-11 NOTE — HISTORY OF PRESENT ILLNESS
[Heartburn] : denies heartburn [Nausea] : denies nausea [Vomiting] : denies vomiting [Diarrhea] : denies diarrhea [Constipation] : denies constipation [Yellow Skin Or Eyes (Jaundice)] : denies jaundice [Abdominal Swelling] : denies abdominal swelling [Rectal Pain] : denies rectal pain [Abdominal Pain] : abdominal pain [Peptic Ulcer Disease] : peptic ulcer disease [Cholelithiasis] : cholelithiasis [Wt Gain ___ Lbs] : no recent weight gain [Wt Loss ___ Lbs] : no recent weight loss [GERD] : no gastroesophageal reflux disease [Hiatus Hernia] : no hiatus hernia [Pancreatitis] : no pancreatitis [Kidney Stone] : no kidney stone [Inflammatory Bowel Disease] : no inflammatory bowel disease [Irritable Bowel Syndrome] : no irritable bowel syndrome [Diverticulitis] : no diverticulitis [Alcohol Abuse] : no alcohol abuse [Malignancy] : no malignancy [Abdominal Surgery] : no abdominal surgery [Appendectomy] : no appendectomy [Cholecystectomy] : no cholecystectomy [de-identified] : JEFFERSON REYES 46 year M with HLD, Gallstones and PUD x 5 years, here for epigastric abdominal pain for 3 days. \par \par Patient had been twice to ED at [facility] within last two weeks with epigastric pain, 10, 10, burning, similar PUD. Patient reports burning sensation in mid abdomen, and later epigastric and chest. Denies RUQ pain. Reports burning pain similar to previous episode of peptic ulcer diagnosed on upper endoscopy 5 years ago, prescribed omeprazole 40 mg daily. Patient was seen at the ED and an ultrasound scan showed cholelithiasis and adenomyomatosis, no acute cholecystitis.  ACT abdomen showed partially contracted gallbladder containing gallstones. Gallbladder wall  thickening or edema, difficult to evaluate secondary to partial  decompression.  A HIDA scan was normal. Surgery stated no cholecystectomy required. In ED, patient hemodynamically stable. Labs significant for leukocytosis 19.  He was given morphine, Maalox and Pepcid in ED, reports improvement with burning sensation. Advised to take omeprazole at home. \par \par Patient diagnosed with cholelithiasis at [facility], two weeks ago after abdominal pain, also found to have leukocytosis of 12. He was discharged on Amoxicillin for 10 days. Pain this time was different from pain two weeks ago, with more burning pain in epigastric area and not LLQ abdominal pain. \par \par Denies NSAID's use.\par \par Patient states of a good appetite, no loss of weight, bowel movement once daily, formed and brown stools without blood or mucus. Denies nausea, vomiting, melena, hematochezia, or hematemesis.\par   \par FH: No 1st degree or 2nd degree relative with colon cancer, gastric cancer or pancreatic cancer.\par \par Office visit: 03/11/2022\par Patient underwent an endoscopy 03/11/2022 showed gastritis and GERD. Bx no bacteria and mild reflux. Patient on omeprazole 40 mg daily and feels well. He did not undergo a MRCP yet.\par He lost weight 16 lbs after cutting down carbs from his diet and exercises regularly with healthy eating habits. He quit smoking and coffee.\par

## 2022-03-11 NOTE — ADDENDUM
[FreeTextEntry1] : The risks and benefits of my recommendations, as well as other treatment options were discussed with the patient today. Questions were answered.\par \par Please feel free to contact for any questions or concerns at my office  in the telephone numbers listed below.\par \par [address], [address], [place], [place], [address] Telephone: [phone] Fax: [phone]\par \par \par

## 2022-09-12 ENCOUNTER — APPOINTMENT (OUTPATIENT)
Dept: GASTROENTEROLOGY | Facility: CLINIC | Age: 47
End: 2022-09-12

## 2022-11-25 ENCOUNTER — EMERGENCY (EMERGENCY)
Facility: HOSPITAL | Age: 47
LOS: 1 days | Discharge: HOME | End: 2022-11-25
Attending: EMERGENCY MEDICINE | Admitting: EMERGENCY MEDICINE

## 2022-11-25 VITALS
HEART RATE: 80 BPM | SYSTOLIC BLOOD PRESSURE: 128 MMHG | RESPIRATION RATE: 17 BRPM | DIASTOLIC BLOOD PRESSURE: 81 MMHG | OXYGEN SATURATION: 100 % | TEMPERATURE: 98 F

## 2022-11-25 VITALS
RESPIRATION RATE: 18 BRPM | OXYGEN SATURATION: 98 % | DIASTOLIC BLOOD PRESSURE: 81 MMHG | SYSTOLIC BLOOD PRESSURE: 134 MMHG | HEART RATE: 59 BPM | TEMPERATURE: 98 F

## 2022-11-25 LAB
ALBUMIN SERPL ELPH-MCNC: 4.9 G/DL — SIGNIFICANT CHANGE UP (ref 3.3–5)
ALP SERPL-CCNC: 78 U/L — SIGNIFICANT CHANGE UP (ref 40–120)
ALT FLD-CCNC: 30 U/L — SIGNIFICANT CHANGE UP (ref 4–41)
ANION GAP SERPL CALC-SCNC: 11 MMOL/L — SIGNIFICANT CHANGE UP (ref 7–14)
APPEARANCE UR: CLEAR — SIGNIFICANT CHANGE UP
AST SERPL-CCNC: 22 U/L — SIGNIFICANT CHANGE UP (ref 4–40)
BACTERIA # UR AUTO: NEGATIVE — SIGNIFICANT CHANGE UP
BASOPHILS # BLD AUTO: 0.06 K/UL — SIGNIFICANT CHANGE UP (ref 0–0.2)
BASOPHILS NFR BLD AUTO: 0.5 % — SIGNIFICANT CHANGE UP (ref 0–2)
BILIRUB SERPL-MCNC: 0.2 MG/DL — SIGNIFICANT CHANGE UP (ref 0.2–1.2)
BILIRUB UR-MCNC: NEGATIVE — SIGNIFICANT CHANGE UP
BUN SERPL-MCNC: 16 MG/DL — SIGNIFICANT CHANGE UP (ref 7–23)
CALCIUM SERPL-MCNC: 9.2 MG/DL — SIGNIFICANT CHANGE UP (ref 8.4–10.5)
CHLORIDE SERPL-SCNC: 99 MMOL/L — SIGNIFICANT CHANGE UP (ref 98–107)
CO2 SERPL-SCNC: 24 MMOL/L — SIGNIFICANT CHANGE UP (ref 22–31)
COLOR SPEC: YELLOW — SIGNIFICANT CHANGE UP
CREAT SERPL-MCNC: 0.83 MG/DL — SIGNIFICANT CHANGE UP (ref 0.5–1.3)
DIFF PNL FLD: NEGATIVE — SIGNIFICANT CHANGE UP
EGFR: 110 ML/MIN/1.73M2 — SIGNIFICANT CHANGE UP
EOSINOPHIL # BLD AUTO: 0.22 K/UL — SIGNIFICANT CHANGE UP (ref 0–0.5)
EOSINOPHIL NFR BLD AUTO: 1.8 % — SIGNIFICANT CHANGE UP (ref 0–6)
EPI CELLS # UR: 0 /HPF — SIGNIFICANT CHANGE UP (ref 0–5)
GLUCOSE SERPL-MCNC: 128 MG/DL — HIGH (ref 70–99)
GLUCOSE UR QL: NEGATIVE — SIGNIFICANT CHANGE UP
HCT VFR BLD CALC: 43.5 % — SIGNIFICANT CHANGE UP (ref 39–50)
HGB BLD-MCNC: 15.4 G/DL — SIGNIFICANT CHANGE UP (ref 13–17)
HYALINE CASTS # UR AUTO: 0 /LPF — SIGNIFICANT CHANGE UP (ref 0–7)
IANC: 9.87 K/UL — HIGH (ref 1.8–7.4)
IMM GRANULOCYTES NFR BLD AUTO: 0.8 % — SIGNIFICANT CHANGE UP (ref 0–0.9)
KETONES UR-MCNC: NEGATIVE — SIGNIFICANT CHANGE UP
LEUKOCYTE ESTERASE UR-ACNC: NEGATIVE — SIGNIFICANT CHANGE UP
LIDOCAIN IGE QN: 57 U/L — SIGNIFICANT CHANGE UP (ref 7–60)
LYMPHOCYTES # BLD AUTO: 1.43 K/UL — SIGNIFICANT CHANGE UP (ref 1–3.3)
LYMPHOCYTES # BLD AUTO: 11.6 % — LOW (ref 13–44)
MCHC RBC-ENTMCNC: 32.8 PG — SIGNIFICANT CHANGE UP (ref 27–34)
MCHC RBC-ENTMCNC: 35.4 GM/DL — SIGNIFICANT CHANGE UP (ref 32–36)
MCV RBC AUTO: 92.6 FL — SIGNIFICANT CHANGE UP (ref 80–100)
MONOCYTES # BLD AUTO: 0.67 K/UL — SIGNIFICANT CHANGE UP (ref 0–0.9)
MONOCYTES NFR BLD AUTO: 5.4 % — SIGNIFICANT CHANGE UP (ref 2–14)
NEUTROPHILS # BLD AUTO: 9.87 K/UL — HIGH (ref 1.8–7.4)
NEUTROPHILS NFR BLD AUTO: 79.9 % — HIGH (ref 43–77)
NITRITE UR-MCNC: NEGATIVE — SIGNIFICANT CHANGE UP
NRBC # BLD: 0 /100 WBCS — SIGNIFICANT CHANGE UP (ref 0–0)
NRBC # FLD: 0 K/UL — SIGNIFICANT CHANGE UP (ref 0–0)
PH UR: 6 — SIGNIFICANT CHANGE UP (ref 5–8)
PLATELET # BLD AUTO: 287 K/UL — SIGNIFICANT CHANGE UP (ref 150–400)
POTASSIUM SERPL-MCNC: 3.9 MMOL/L — SIGNIFICANT CHANGE UP (ref 3.5–5.3)
POTASSIUM SERPL-SCNC: 3.9 MMOL/L — SIGNIFICANT CHANGE UP (ref 3.5–5.3)
PROT SERPL-MCNC: 7.6 G/DL — SIGNIFICANT CHANGE UP (ref 6–8.3)
PROT UR-MCNC: ABNORMAL
RBC # BLD: 4.7 M/UL — SIGNIFICANT CHANGE UP (ref 4.2–5.8)
RBC # FLD: 12.2 % — SIGNIFICANT CHANGE UP (ref 10.3–14.5)
RBC CASTS # UR COMP ASSIST: 3 /HPF — SIGNIFICANT CHANGE UP (ref 0–4)
SODIUM SERPL-SCNC: 134 MMOL/L — LOW (ref 135–145)
SP GR SPEC: 1.03 — SIGNIFICANT CHANGE UP (ref 1.01–1.05)
UROBILINOGEN FLD QL: SIGNIFICANT CHANGE UP
WBC # BLD: 12.35 K/UL — HIGH (ref 3.8–10.5)
WBC # FLD AUTO: 12.35 K/UL — HIGH (ref 3.8–10.5)
WBC UR QL: 1 /HPF — SIGNIFICANT CHANGE UP (ref 0–5)

## 2022-11-25 PROCEDURE — 99285 EMERGENCY DEPT VISIT HI MDM: CPT

## 2022-11-25 RX ORDER — FAMOTIDINE 10 MG/ML
20 INJECTION INTRAVENOUS ONCE
Refills: 0 | Status: COMPLETED | OUTPATIENT
Start: 2022-11-25 | End: 2022-11-25

## 2022-11-25 RX ORDER — LIDOCAINE 4 G/100G
10 CREAM TOPICAL ONCE
Refills: 0 | Status: COMPLETED | OUTPATIENT
Start: 2022-11-25 | End: 2022-11-25

## 2022-11-25 RX ORDER — SODIUM CHLORIDE 9 MG/ML
1000 INJECTION INTRAMUSCULAR; INTRAVENOUS; SUBCUTANEOUS ONCE
Refills: 0 | Status: COMPLETED | OUTPATIENT
Start: 2022-11-25 | End: 2022-11-25

## 2022-11-25 RX ADMIN — Medication 30 MILLILITER(S): at 02:27

## 2022-11-25 RX ADMIN — FAMOTIDINE 20 MILLIGRAM(S): 10 INJECTION INTRAVENOUS at 02:29

## 2022-11-25 RX ADMIN — LIDOCAINE 10 MILLILITER(S): 4 CREAM TOPICAL at 02:28

## 2022-11-25 RX ADMIN — SODIUM CHLORIDE 1000 MILLILITER(S): 9 INJECTION INTRAMUSCULAR; INTRAVENOUS; SUBCUTANEOUS at 02:40

## 2022-11-25 NOTE — ED ADULT TRIAGE NOTE - CHIEF COMPLAINT QUOTE
Pt generalized abd pain and back pain starting at 2230hrs. Denies chest pain, sob, n/v/d, fevers/chills. Appears extremely uncomfortable.  Pmhx: cholelithiasis, PUD Pt generalized abd pain and back pain starting at 2230hrs. Denies chest pain, sob, n/v/d, fevers/chills. Appears uncomfortable. Pmhx: cholelithiasis, PUD

## 2022-11-25 NOTE — ED PROVIDER NOTE - PHYSICAL EXAMINATION
Marianne Iverson MD  GENERAL: Patient awake alert NAD.  HEENT: NC/AT, Moist mucous membranes, EOMI.  LUNGS: CTAB, no wheezes or crackles.   CARDIAC: RRR, no m/r/g.    ABDOMEN: Soft, NT, ND, No rebound, guarding. No CVA tenderness.   EXT: No edema. No calf tenderness.  MSK: No pain with movement, no deformities.  NEURO: A&Ox3. Moving all extremities.  SKIN: Warm and dry. No rash.  PSYCH: Normal affect.

## 2022-11-25 NOTE — ED PROVIDER NOTE - OBJECTIVE STATEMENT
Pt is a 46 yo M with a h/o cholelithiasis, PUD, gastritis who presents with abdominal pain. Pt ate large, fatty Thanksgiving meal at 9pm. At 1030pm pt started having epigastric burning and lower back burning. Pain does not radiate from front to back but is simultaneous. No dysuria or hematuria, n/v, fevers, chills, CP, SOB. Feels like his normal gastritis. Has been admitted for this pain before, had endoscopy. At home took oxycodone

## 2022-11-25 NOTE — ED PROVIDER NOTE - ATTENDING CONTRIBUTION TO CARE
HPI: Pt is a 48 yo M with a h/o cholelithiasis, PUD, gastritis who presents with abdominal pain. Pt ate large, fatty Thanksgiving meal at 9pm. At 1030pm pt started having epigastric burning and lower back burning. Pain does not radiate from front to back but is simultaneous. No dysuria or hematuria, n/v, fevers, chills, CP, SOB. Feels like his normal gastritis. Has been admitted for this pain before, had endoscopy. At home took oxycodone. Now no pain and no other complaints. Has urinated normally without blood.  EXAM: NAD, speaking full sentences, no CVAT bilaterally, abd soft nontender, neg murphys, neg mcburneys, No rebound/guarding.   MDM: pt with history of gallstones and gastritis that presents with abd pain, epigastric, nonradiating, no other symptoms, took home dose meds and now without complaints. benign exam. Likely gastritis. Will obtain labs, provide GI cocktail and also check UA to look for hematuria, if none then highly unlikely kidney stones. likely gastritis and will discharge home if all neg without complaints.

## 2022-11-25 NOTE — ED ADULT NURSE NOTE - OBJECTIVE STATEMENT
Received pt in room 8. A&Ox4, ambulatory at baseline Hx cholelithiasis, PUD, gastritis c/o abdominal pain, simultaneous to back since 2230. States ate large, fatty Thanksgiving meal at 2100, 2230 epigastric burning and lower back burning. States Has been admitted for this pain before earlier this year, had endoscopy, gallstones in past.. States took axy before arrival, family at bedside drove. Endorses nausea earlier, no vomiting, no nausea currently. ABD is soft, non tender, non distended with normal active bowel sounds Denies CP, SOB, N+V, diarrhea, headache, dizziness, fever, chills, exposure to sick, bowel/bladder changes. VSS. RR even and unlabored. 20g placed in left AC. Labs sent. Medication given. Awaiting further orders from provider.

## 2022-11-25 NOTE — ED PROVIDER NOTE - PATIENT PORTAL LINK FT
You can access the FollowMyHealth Patient Portal offered by [facility] by registering at the following website: [email or web address]. By joining [employer]’s FollowMyHealth portal, you will also be able to view your health information using other applications (apps) compatible with our system.

## 2022-11-25 NOTE — ED PROVIDER NOTE - CLINICAL SUMMARY MEDICAL DECISION MAKING FREE TEXT BOX
Zayra - Pt is a 48 yo M with a h/o cholelithiasis, PUD, gastritis who presents with abdominal pain. ddx: gastritis vs PUD vs kidney stone or pancreatitis less likely. No RUQ pain, gallstone pathology less likely. Will check cbc, cmp, lipase, urine. GI cocktail

## 2022-11-25 NOTE — ED PROVIDER NOTE - NSFOLLOWUPINSTRUCTIONS_ED_ALL_ED_FT
You were seen in the ED for abdominal pain.    Your blood work was reassuring.    Your pain may be due to your gastritis. Please follow up with your gastroenterologists in 2-3 days for further management.    Take pepcid and Maalox and Tylenol as per the over the counter bottles. Avoid NSAIDs such as ibuprofen as that can irritate your stomach. Avoid fatty and spicey foods.    Return to the ED if you experience any worsening or new symptoms or any symptoms that concern you, including fevers, chills, shortness of breath, chest pain, abdominal pain, vomiting.

## 2022-11-25 NOTE — ED ADULT NURSE NOTE - CHIEF COMPLAINT QUOTE
Pt generalized abd pain and back pain starting at 2230hrs. Denies chest pain, sob, n/v/d, fevers/chills. Appears uncomfortable. Pmhx: cholelithiasis, PUD

## 2022-11-25 NOTE — ED ADULT NURSE NOTE - NSICDXPASTMEDICALHX_GEN_ALL_CORE_FT
PAST MEDICAL HISTORY:  Gallstones 1/2022 treated with amoxicillin 10 days at [facility] (hyperlipidemia)     Peptic ulcer disease

## 2022-11-25 NOTE — ED PROVIDER NOTE - NSICDXPASTMEDICALHX_GEN_ALL_CORE_FT
PAST MEDICAL HISTORY:  Gallstones 1/2022 treated with amoxicillin 10 days at [facility] (hyperlipidemia)     Peptic ulcer disease

## 2022-11-26 LAB
CULTURE RESULTS: NO GROWTH — SIGNIFICANT CHANGE UP
SPECIMEN SOURCE: SIGNIFICANT CHANGE UP

## 2022-12-07 ENCOUNTER — EMERGENCY (EMERGENCY)
Facility: HOSPITAL | Age: 47
LOS: 1 days | Discharge: HOME | End: 2022-12-07
Attending: EMERGENCY MEDICINE | Admitting: EMERGENCY MEDICINE

## 2022-12-07 VITALS
RESPIRATION RATE: 19 BRPM | SYSTOLIC BLOOD PRESSURE: 142 MMHG | DIASTOLIC BLOOD PRESSURE: 90 MMHG | HEART RATE: 61 BPM | OXYGEN SATURATION: 99 %

## 2022-12-07 LAB
ALBUMIN SERPL ELPH-MCNC: 4.6 G/DL — SIGNIFICANT CHANGE UP (ref 3.3–5)
ALP SERPL-CCNC: 93 U/L — SIGNIFICANT CHANGE UP (ref 40–120)
ALT FLD-CCNC: 47 U/L — HIGH (ref 4–41)
ANION GAP SERPL CALC-SCNC: 11 MMOL/L — SIGNIFICANT CHANGE UP (ref 7–14)
AST SERPL-CCNC: 66 U/L — HIGH (ref 4–40)
BASOPHILS # BLD AUTO: 0.04 K/UL — SIGNIFICANT CHANGE UP (ref 0–0.2)
BASOPHILS NFR BLD AUTO: 0.4 % — SIGNIFICANT CHANGE UP (ref 0–2)
BILIRUB SERPL-MCNC: 0.4 MG/DL — SIGNIFICANT CHANGE UP (ref 0.2–1.2)
BUN SERPL-MCNC: 19 MG/DL — SIGNIFICANT CHANGE UP (ref 7–23)
CALCIUM SERPL-MCNC: 9 MG/DL — SIGNIFICANT CHANGE UP (ref 8.4–10.5)
CHLORIDE SERPL-SCNC: 100 MMOL/L — SIGNIFICANT CHANGE UP (ref 98–107)
CO2 SERPL-SCNC: 24 MMOL/L — SIGNIFICANT CHANGE UP (ref 22–31)
CREAT SERPL-MCNC: 0.94 MG/DL — SIGNIFICANT CHANGE UP (ref 0.5–1.3)
EGFR: 102 ML/MIN/1.73M2 — SIGNIFICANT CHANGE UP
EOSINOPHIL # BLD AUTO: 0.1 K/UL — SIGNIFICANT CHANGE UP (ref 0–0.5)
EOSINOPHIL NFR BLD AUTO: 1 % — SIGNIFICANT CHANGE UP (ref 0–6)
GLUCOSE SERPL-MCNC: 149 MG/DL — HIGH (ref 70–99)
HCT VFR BLD CALC: 42.2 % — SIGNIFICANT CHANGE UP (ref 39–50)
HGB BLD-MCNC: 14.9 G/DL — SIGNIFICANT CHANGE UP (ref 13–17)
IANC: 7.76 K/UL — HIGH (ref 1.8–7.4)
IMM GRANULOCYTES NFR BLD AUTO: 0.5 % — SIGNIFICANT CHANGE UP (ref 0–0.9)
LIDOCAIN IGE QN: 52 U/L — SIGNIFICANT CHANGE UP (ref 7–60)
LYMPHOCYTES # BLD AUTO: 1.36 K/UL — SIGNIFICANT CHANGE UP (ref 1–3.3)
LYMPHOCYTES # BLD AUTO: 14.1 % — SIGNIFICANT CHANGE UP (ref 13–44)
MCHC RBC-ENTMCNC: 32.6 PG — SIGNIFICANT CHANGE UP (ref 27–34)
MCHC RBC-ENTMCNC: 35.3 GM/DL — SIGNIFICANT CHANGE UP (ref 32–36)
MCV RBC AUTO: 92.3 FL — SIGNIFICANT CHANGE UP (ref 80–100)
MONOCYTES # BLD AUTO: 0.35 K/UL — SIGNIFICANT CHANGE UP (ref 0–0.9)
MONOCYTES NFR BLD AUTO: 3.6 % — SIGNIFICANT CHANGE UP (ref 2–14)
NEUTROPHILS # BLD AUTO: 7.76 K/UL — HIGH (ref 1.8–7.4)
NEUTROPHILS NFR BLD AUTO: 80.4 % — HIGH (ref 43–77)
NRBC # BLD: 0 /100 WBCS — SIGNIFICANT CHANGE UP (ref 0–0)
NRBC # FLD: 0 K/UL — SIGNIFICANT CHANGE UP (ref 0–0)
PLATELET # BLD AUTO: 283 K/UL — SIGNIFICANT CHANGE UP (ref 150–400)
POTASSIUM SERPL-MCNC: 3.8 MMOL/L — SIGNIFICANT CHANGE UP (ref 3.5–5.3)
POTASSIUM SERPL-SCNC: 3.8 MMOL/L — SIGNIFICANT CHANGE UP (ref 3.5–5.3)
PROT SERPL-MCNC: 7.1 G/DL — SIGNIFICANT CHANGE UP (ref 6–8.3)
RBC # BLD: 4.57 M/UL — SIGNIFICANT CHANGE UP (ref 4.2–5.8)
RBC # FLD: 11.9 % — SIGNIFICANT CHANGE UP (ref 10.3–14.5)
SODIUM SERPL-SCNC: 135 MMOL/L — SIGNIFICANT CHANGE UP (ref 135–145)
TROPONIN T, HIGH SENSITIVITY RESULT: 8 NG/L — SIGNIFICANT CHANGE UP
WBC # BLD: 9.66 K/UL — SIGNIFICANT CHANGE UP (ref 3.8–10.5)
WBC # FLD AUTO: 9.66 K/UL — SIGNIFICANT CHANGE UP (ref 3.8–10.5)

## 2022-12-07 PROCEDURE — 99284 EMERGENCY DEPT VISIT MOD MDM: CPT

## 2022-12-07 RX ORDER — ONDANSETRON 8 MG/1
4 TABLET, FILM COATED ORAL ONCE
Refills: 0 | Status: COMPLETED | OUTPATIENT
Start: 2022-12-07 | End: 2022-12-07

## 2022-12-07 RX ORDER — PANTOPRAZOLE SODIUM 20 MG/1
40 TABLET, DELAYED RELEASE ORAL ONCE
Refills: 0 | Status: COMPLETED | OUTPATIENT
Start: 2022-12-07 | End: 2022-12-07

## 2022-12-07 RX ADMIN — PANTOPRAZOLE SODIUM 40 MILLIGRAM(S): 20 TABLET, DELAYED RELEASE ORAL at 07:45

## 2022-12-07 RX ADMIN — ONDANSETRON 4 MILLIGRAM(S): 8 TABLET, FILM COATED ORAL at 07:45

## 2022-12-07 NOTE — ED PROVIDER NOTE - NS ED ATTENDING STATEMENT MOD
This was a shared visit with the DONA. I reviewed and verified the documentation and independently performed the documented:

## 2022-12-07 NOTE — ED PROVIDER NOTE - PHYSICAL EXAMINATION
Vital signs reviewed.   CONSTITUTIONAL: +uncomfortable appearing, no distress.   HEAD: Normocephalic, atraumatic.r  NECK Supple; non-tender  CARD: Normal S1, S2, rate regular   RESP: breath sounds clear and equal bilaterally, normal respiratory effort  ABD/GI: soft, non-distended, non-tender  EXT/MS: moves all extremities  SKIN: warm, dry  NEURO: Awake, alert, oriented x 3 Vital signs reviewed.   CONSTITUTIONAL: +uncomfortable appearing, no distress.   HEAD: Normocephalic, atraumatic.r  NECK Supple; non-tender  CARD: Normal S1, S2, rate regular   RESP: breath sounds clear and equal bilaterally, normal respiratory effort  ABD/GI: soft, non-distended, non-tender  EXT/MS: moves all extremities  SKIN: warm, dry  NEURO: Awake, alert, oriented x 3  ATTENDING PHYSICAL EXAM  GEN - NAD; well appearing; A+O x3  HEAD - NC/AT; EYES/NOSE - PERRL, EOMI, mucous membranes moist, no discharge; THROAT: Oral cavity and pharynx normal. No inflammation, swelling, exudate, or lesions  NECK: Neck supple, non-tender without lymphadenopathy, no masses, no JVD  PULMONARY - CTA b/l, symmetric breath sounds, no w/r/r  CARDIAC -s1s2, RRR, no M,R,G  ABDOMEN - +NABS, ND, NT, soft, no guarding, no rebound, no masses   BACK - no CVA tenderness, No vertebral or paravertebral tenderness  EXTREMITIES - symmetric pulses, 2+ dp, capillary refill < 2 seconds, no clubbing, no cyanosis, no edema

## 2022-12-07 NOTE — ED ADULT NURSE NOTE - OBJECTIVE STATEMENT
pt received to rm 13  , a&ox4 , ambulatory , pmh of gastritis , p/w abd pain worsening after eating and dark stool since yesterday  . Pt breathing even and unlabored on room air. Denies fever, chills, cough, SOB, chest pain, palpitations, dizziness, D, constipation, numbness, tingling. IV placed. Labs collected and sent.  pt educated on fall precautions and confirms understanding via teach back method. Stretcher locked in lowest position with siderails up x2. Call bell and personal items within reach.

## 2022-12-07 NOTE — ED PROVIDER NOTE - NSFOLLOWUPINSTRUCTIONS_ED_ALL_ED_FT
Take medications as discussed.   Follow up with GI.   Return to the ER for any new, persistent, or worsening condition.

## 2022-12-07 NOTE — ED PROVIDER NOTE - PROGRESS NOTE DETAILS
Tony- Patient reports significant relief of pain after medications. Currently no pain, abdomen still soft and non tender. Stable for discharge. Recommended omeprazole daily and f/u with GI. Return precautions to the ER discussed.

## 2022-12-07 NOTE — ED PROVIDER NOTE - PATIENT PORTAL LINK FT
You can access the FollowMyHealth Patient Portal offered by [facility] by registering at the following website: [email or web address]. By joining [employer]’s FollowMyHealth portal, you will also be able to view your health information using other applications (apps) compatible with our system.

## 2022-12-07 NOTE — ED ADULT NURSE NOTE - NSICDXPASTMEDICALHX_GEN_ALL_CORE_FT
PAST MEDICAL HISTORY:  Gallstones 1/2022 treated with amoxicillin 10 days at [facility] (hyperlipidemia)     Peptic ulcer disease

## 2022-12-07 NOTE — ED PROVIDER NOTE - NS ED ROS FT
ROS:  GENERAL: No fever, no chills  CARDIAC: no chest pain, no palpitations  PULMONARY: no cough, no shortness of breath  GI: + abdominal pain, + nausea, no vomiting, no diarrhea, no constipation  : No dysuria, no frequency, no change in appearance, or odor of urine  SKIN: no rashes  NEURO: no headache, no weakness  MSK: No joint pain

## 2022-12-07 NOTE — ED PROVIDER NOTE - NSICDXPASTMEDICALHX_GEN_ALL_CORE_FT
PAST MEDICAL HISTORY:  Gallstones 1/2022 treated with amoxicillin 10 days at [facility] (hyperlipidemia)     Peptic ulcer disease

## 2022-12-07 NOTE — ED PROVIDER NOTE - CLINICAL SUMMARY MEDICAL DECISION MAKING FREE TEXT BOX
48 yo male presents to the ER with epigastric pain and nausea for 2 hours. Pain feel similar to prior episodes of gastritis. Patient appears uncomfortable, but no abdominal tenderness on exam. Plan for labs, anti emetics, antacid, reassess.

## 2022-12-07 NOTE — ED PROVIDER NOTE - OBJECTIVE STATEMENT
48 yo male with PMH gallstones, gastritis/PUD, who presents to the ER c/o epigastric pain and nausea that started 2 hours ago. He woke up from sleep with epigastric pain and nausea. He took pepcid, maalox, and percocet with mild relief. He ate pizza last night for dinner. He denies fever, chills, chest pain, SOB, vomiting, diarrhea, flank pain, urinary symptoms, rash, or any other acute complaints. 48 yo male with PMH gallstones, gastritis/PUD, who presents to the ER c/o epigastric pain and nausea that started 2 hours ago. He woke up from sleep with epigastric pain and nausea. He took pepcid, maalox, and percocet with mild relief. He ate pizza last night for dinner. He denies fever, chills, chest pain, SOB, vomiting, diarrhea, flank pain, urinary symptoms, rash, or any other acute complaints.  Attending - Agree with above.  I evaluated patient myself. 27-year-old female with sister at bedside assisting with history.  Previous [facility] reviewed.  Noted past medical history of cholelithiasis and gastritis.  Reports recent EGD showed gastritis and started on omeprazole.  Start omeprazole approximately 3 weeks ago.  Reports abdominal pain started overnight after eating pizza.  Pain is similar to previous episodes of gastritis.  Positive nausea.  No vomiting.  No fever, cough, recent COVID.

## 2022-12-07 NOTE — ED ADULT TRIAGE NOTE - ARRIVAL FROM
Patient transferred to [facility] bed with staff assist. Patient tolerated well. Home

## 2022-12-07 NOTE — ED ADULT TRIAGE NOTE - CHIEF COMPLAINT QUOTE
Pt arrives doubled over....as per Sister...."He was just here for gastritis 2 weeks ago awoke this am with same pain"   unable to obtain temp. Pt nauseus.

## 2022-12-09 ENCOUNTER — NON-APPOINTMENT (OUTPATIENT)
Age: 47
End: 2022-12-09

## 2022-12-11 ENCOUNTER — INPATIENT (INPATIENT)
Facility: HOSPITAL | Age: 47
LOS: 3 days | Discharge: HOME | End: 2022-12-15
Attending: SPECIALIST | Admitting: SPECIALIST

## 2022-12-11 ENCOUNTER — TRANSCRIPTION ENCOUNTER (OUTPATIENT)
Age: 47
End: 2022-12-11

## 2022-12-11 VITALS
OXYGEN SATURATION: 98 % | RESPIRATION RATE: 16 BRPM | TEMPERATURE: 98 F | HEART RATE: 82 BPM | SYSTOLIC BLOOD PRESSURE: 145 MMHG | DIASTOLIC BLOOD PRESSURE: 90 MMHG

## 2022-12-11 LAB
ALBUMIN SERPL ELPH-MCNC: 4.8 G/DL — SIGNIFICANT CHANGE UP (ref 3.3–5)
ALP SERPL-CCNC: 213 U/L — HIGH (ref 40–120)
ALT FLD-CCNC: 213 U/L — HIGH (ref 4–41)
ANION GAP SERPL CALC-SCNC: 14 MMOL/L — SIGNIFICANT CHANGE UP (ref 7–14)
APTT BLD: 37.4 SEC — HIGH (ref 27–36.3)
AST SERPL-CCNC: 192 U/L — HIGH (ref 4–40)
BASOPHILS # BLD AUTO: 0.03 K/UL — SIGNIFICANT CHANGE UP (ref 0–0.2)
BASOPHILS NFR BLD AUTO: 0.3 % — SIGNIFICANT CHANGE UP (ref 0–2)
BILIRUB SERPL-MCNC: 1.4 MG/DL — HIGH (ref 0.2–1.2)
BUN SERPL-MCNC: 13 MG/DL — SIGNIFICANT CHANGE UP (ref 7–23)
CALCIUM SERPL-MCNC: 9.6 MG/DL — SIGNIFICANT CHANGE UP (ref 8.4–10.5)
CHLORIDE SERPL-SCNC: 96 MMOL/L — LOW (ref 98–107)
CO2 SERPL-SCNC: 26 MMOL/L — SIGNIFICANT CHANGE UP (ref 22–31)
CREAT SERPL-MCNC: 0.94 MG/DL — SIGNIFICANT CHANGE UP (ref 0.5–1.3)
EGFR: 102 ML/MIN/1.73M2 — SIGNIFICANT CHANGE UP
EOSINOPHIL # BLD AUTO: 0.21 K/UL — SIGNIFICANT CHANGE UP (ref 0–0.5)
EOSINOPHIL NFR BLD AUTO: 2 % — SIGNIFICANT CHANGE UP (ref 0–6)
GLUCOSE SERPL-MCNC: 93 MG/DL — SIGNIFICANT CHANGE UP (ref 70–99)
HCT VFR BLD CALC: 43.2 % — SIGNIFICANT CHANGE UP (ref 39–50)
HGB BLD-MCNC: 15.4 G/DL — SIGNIFICANT CHANGE UP (ref 13–17)
IANC: 7.15 K/UL — SIGNIFICANT CHANGE UP (ref 1.8–7.4)
IMM GRANULOCYTES NFR BLD AUTO: 0.3 % — SIGNIFICANT CHANGE UP (ref 0–0.9)
INR BLD: 1.1 RATIO — SIGNIFICANT CHANGE UP (ref 0.88–1.16)
LIDOCAIN IGE QN: 46 U/L — SIGNIFICANT CHANGE UP (ref 7–60)
LYMPHOCYTES # BLD AUTO: 2.35 K/UL — SIGNIFICANT CHANGE UP (ref 1–3.3)
LYMPHOCYTES # BLD AUTO: 22.7 % — SIGNIFICANT CHANGE UP (ref 13–44)
MCHC RBC-ENTMCNC: 32.6 PG — SIGNIFICANT CHANGE UP (ref 27–34)
MCHC RBC-ENTMCNC: 35.6 GM/DL — SIGNIFICANT CHANGE UP (ref 32–36)
MCV RBC AUTO: 91.5 FL — SIGNIFICANT CHANGE UP (ref 80–100)
MONOCYTES # BLD AUTO: 0.59 K/UL — SIGNIFICANT CHANGE UP (ref 0–0.9)
MONOCYTES NFR BLD AUTO: 5.7 % — SIGNIFICANT CHANGE UP (ref 2–14)
NEUTROPHILS # BLD AUTO: 7.15 K/UL — SIGNIFICANT CHANGE UP (ref 1.8–7.4)
NEUTROPHILS NFR BLD AUTO: 69 % — SIGNIFICANT CHANGE UP (ref 43–77)
NRBC # BLD: 0 /100 WBCS — SIGNIFICANT CHANGE UP (ref 0–0)
NRBC # FLD: 0 K/UL — SIGNIFICANT CHANGE UP (ref 0–0)
PLATELET # BLD AUTO: 318 K/UL — SIGNIFICANT CHANGE UP (ref 150–400)
POTASSIUM SERPL-MCNC: 4 MMOL/L — SIGNIFICANT CHANGE UP (ref 3.5–5.3)
POTASSIUM SERPL-SCNC: 4 MMOL/L — SIGNIFICANT CHANGE UP (ref 3.5–5.3)
PROT SERPL-MCNC: 7.5 G/DL — SIGNIFICANT CHANGE UP (ref 6–8.3)
PROTHROM AB SERPL-ACNC: 12.8 SEC — SIGNIFICANT CHANGE UP (ref 10.5–13.4)
RBC # BLD: 4.72 M/UL — SIGNIFICANT CHANGE UP (ref 4.2–5.8)
RBC # FLD: 11.8 % — SIGNIFICANT CHANGE UP (ref 10.3–14.5)
SODIUM SERPL-SCNC: 136 MMOL/L — SIGNIFICANT CHANGE UP (ref 135–145)
TROPONIN T, HIGH SENSITIVITY RESULT: 7 NG/L — SIGNIFICANT CHANGE UP
WBC # BLD: 10.36 K/UL — SIGNIFICANT CHANGE UP (ref 3.8–10.5)
WBC # FLD AUTO: 10.36 K/UL — SIGNIFICANT CHANGE UP (ref 3.8–10.5)

## 2022-12-11 PROCEDURE — 99285 EMERGENCY DEPT VISIT HI MDM: CPT

## 2022-12-11 PROCEDURE — 76705 ECHO EXAM OF ABDOMEN: CPT | Mod: 26

## 2022-12-11 PROCEDURE — 74177 CT ABD & PELVIS W/CONTRAST: CPT | Mod: 26,MA

## 2022-12-11 RX ORDER — ACETAMINOPHEN 500 MG
1000 TABLET ORAL ONCE
Refills: 0 | Status: COMPLETED | OUTPATIENT
Start: 2022-12-11 | End: 2022-12-11

## 2022-12-11 RX ORDER — SODIUM CHLORIDE 9 MG/ML
1000 INJECTION, SOLUTION INTRAVENOUS ONCE
Refills: 0 | Status: COMPLETED | OUTPATIENT
Start: 2022-12-11 | End: 2022-12-11

## 2022-12-11 RX ADMIN — SODIUM CHLORIDE 1000 MILLILITER(S): 9 INJECTION, SOLUTION INTRAVENOUS at 21:34

## 2022-12-11 RX ADMIN — Medication 400 MILLIGRAM(S): at 21:34

## 2022-12-11 NOTE — ED ADULT NURSE NOTE - NSICDXPASTMEDICALHX_GEN_ALL_CORE_FT
PAST MEDICAL HISTORY:  Gallstones 1/2022 treated with amoxicillin 10 days at [facility] (hyperlipidemia)     Peptic ulcer disease

## 2022-12-11 NOTE — ED PROVIDER NOTE - PHYSICAL EXAMINATION
VS:  unremarkable    GEN - mild-mod distress abd pain, malaise;   A+O x3   HEAD - NC/AT     ENT - PEERL, EOMI, mucous membranes    dry, no discharge      NECK: Neck supple, non-tender without lymphadenopathy, no masses, no JVD  PULM - CTA b/l,  symmetric breath sounds  COR -  normal heart sounds    ABD - , ND, RUQ ttp, but also diffuse abd ttp, soft,  BACK - no CVA tenderness, nontender spine     EXTREMS - no edema, no deformity, warm and well perfused    SKIN - no rash    or bruising      NEUROLOGIC - alert, face symmetric, speech fluent, sensation nl, motor no focal deficit.

## 2022-12-11 NOTE — ED ADULT TRIAGE NOTE - CHIEF COMPLAINT QUOTE
Patient has a h/o gall stones, c/o burning pain radiating to his back since 5 pm tonight. Patient appears very uncomfortable  in triage.

## 2022-12-11 NOTE — ED PROVIDER NOTE - CLINICAL SUMMARY MEDICAL DECISION MAKING FREE TEXT BOX
47M p/w diffuse abd pain and chest pain.  Pt says was here 5x for this already.  Started at 5pm today, preceeded by eating a piece of salmon.  No V not nauseous.  c/o mid back burning.  No diarrhea.  Pt saying his stool is black to dark grey.  Pt had endoscopy has gastritis.  Pt was here in feb had US showing gallstones, but HIDA was neg.  Pt was rx'ed omeprazole, has been taking it.  Likely biliary colic r/o acute chata, has had multiple episodes of his.  Pt has appt to see surgeon in 2 days.  Pt took percocet prior to arrival, will rx IV tylenol.  Check labs, trops, EKG, CE, lipase r/o pancreatitis, urine r/o pyelo, CT eval for chata or alternative explanation, rx fluids, reass.  If pt with gallstones still would c/s surgery and recc surgery due to recurrent biliary colic.  Pt reports he is having apprehension regarding eating due to frequent episodes like this

## 2022-12-11 NOTE — ED ADULT TRIAGE NOTE - HEART RATE (BEATS/MIN)
Received report from night shift RN. Assumed care of pt. Assessment and chart check complete. Pt is AAOX4, denies nausea/vomiting. Dressing CDI, wound vac in place. Urostomy bag connected to drainage bag for voiding in place. Repositioning every 2 hours.  Fall precautions in place, treaded socks on pt, bed in low position. Call light within reach. Educated pt to call if needing anything. Hourly rounding.           82

## 2022-12-11 NOTE — ED PROVIDER NOTE - PROGRESS NOTE DETAILS
DD ED ATTG:  still having pain but a bit better.  Declined morphine.  would hold off of toradol until surgery decides the plan.  Explained findings to pt - still likely symtpomatic cholelithiasis w/o sign of cholecystitis.  LFTs c/f obstructive picture.  Pt likely will need MRCP would consider medical admission for MRCP ERCP, trend LFT.

## 2022-12-11 NOTE — ED ADULT NURSE NOTE - SUICIDE SCREENING DEPRESSION
Patient:   NORRIS GRIMALDO            MRN: [id number]            FIN: [id number]              Age:   22 years     Sex:  FEMALE     :  99   Associated Diagnoses:   None   Author:   RANJEET, ILANA DUNCAN      R1 Vaginal Delivery Note  This  23yo G3 now   delivered a live 40+4 infant, weighing _ gm, with  Apgars of _/_. Delivered _ position. _A nuchal cord was noted and reduced at the perineum. Body delivered without difficulty; nose and mouth bulb suctioned. Cord clamped and cut after 30 second delay.  Placenta delivered spontaneously _, intact with a 3 vessel cord. Fundus firm, with IV pitocin and fundal massage. Cervix, perineum and vagina inspected without  lacerations noted. EBL 300cc. Excellent hemostasis. Cord blood, _gases, and _placenta sent for evaluation. Pt tolerated procedure well. Both mother and infant recovering well in room. All sponge and instrument counts are correct. Dr. Esquivel was present for the delivery.  Ilana Dobbins MD  Emergency Medicine PGY-1  [phone]  
Negative

## 2022-12-11 NOTE — ED PROVIDER NOTE - OBJECTIVE STATEMENT
47M p/w diffuse abd pain and chest pain.  Pt says was here 5x for this already.  Started at 5pm today, preceeded by eating a piece of salmon.  No V not nauseous.  c/o mid back burning.  No diarrhea.  Pt saying his stool is black to dark grey.  Pt had endoscopy has gastritis.  Pt was here in feb had US showing gallstones, but HIDA was neg.  Pt was rx'ed omeprazole, has been taking it.  Likely biliary colic r/o acute chata, has had multiple episodes of his.  Pt has appt to see surgeon in 2 days.  Pt took percocet prior to arrival, will rx IV tylenol.  Check labs, trops, EKG, CE, lipase r/o pancreatitis, urine r/o pyelo, CT eval for chata or alternative explanation, rx fluids, reass.  If pt with gallstones still would c/s surgery and recc surgery due to recurrent biliary colic.  Pt reports he is having apprehension regarding eating due to frequent episodes like this  VS:  unremarkable    GEN - mild-mod distress abd pain, malaise;   A+O x3   HEAD - NC/AT     ENT - PEERL, EOMI, mucous membranes    dry, no discharge      NECK: Neck supple, non-tender without lymphadenopathy, no masses, no JVD  PULM - CTA b/l,  symmetric breath sounds  COR -  normal heart sounds    ABD - , ND, RUQ ttp, but also diffuse abd ttp, soft,  BACK - no CVA tenderness, nontender spine     EXTREMS - no edema, no deformity, warm and well perfused    SKIN - no rash    or bruising      NEUROLOGIC - alert, face symmetric, speech fluent, sensation nl, motor no focal deficit.

## 2022-12-11 NOTE — ED ADULT NURSE NOTE - OBJECTIVE STATEMENT
Patient received in Intake room 7. A&Ox4 and ambulatory. Recent hx of gall stones as per pt, c/o generalized abdominal pain radiating to back after eating salmon for dinner around 1700 this evening. Pt states has experienced similar abd pain since 12/7/22. Pt not endorsing n/v/d/constipation. Resp even and unlabored. Pt appears laying comfortably flat as per comfort, lights dimmed. Speaking in full and complete sentences. Denies CP, SOB, nausea, vomiting, headache, lightheadedness, dizziness, fever or chills. IV 20G placed to left AC, labs drawn and sent as ordered. Medicated as ordered. Family at bedside. Bed in lowest position, call bell in reach, wheels locked, side rails up, safety maintained. Awaiting CT, US and urine.

## 2022-12-11 NOTE — ED PROVIDER NOTE - NSICDXPASTMEDICALHX_GEN_ALL_CORE_FT
PAST MEDICAL HISTORY:  Gallstones 1/2022 treated with amoxicillin 10 days at [facility] (hyperlipidemia)     Peptic ulcer disease

## 2022-12-12 ENCOUNTER — TRANSCRIPTION ENCOUNTER (OUTPATIENT)
Age: 47
End: 2022-12-12

## 2022-12-12 DIAGNOSIS — K81.0 ACUTE CHOLECYSTITIS: ICD-10-CM

## 2022-12-12 LAB
APPEARANCE UR: CLEAR — SIGNIFICANT CHANGE UP
BACTERIA # UR AUTO: NEGATIVE — SIGNIFICANT CHANGE UP
BILIRUB UR-MCNC: NEGATIVE — SIGNIFICANT CHANGE UP
BLD GP AB SCN SERPL QL: NEGATIVE — SIGNIFICANT CHANGE UP
COLOR SPEC: YELLOW — SIGNIFICANT CHANGE UP
DIFF PNL FLD: NEGATIVE — SIGNIFICANT CHANGE UP
EPI CELLS # UR: 0 /HPF — SIGNIFICANT CHANGE UP (ref 0–5)
FLUAV AG NPH QL: SIGNIFICANT CHANGE UP
FLUBV AG NPH QL: SIGNIFICANT CHANGE UP
GLUCOSE UR QL: NEGATIVE — SIGNIFICANT CHANGE UP
HYALINE CASTS # UR AUTO: 0 /LPF — SIGNIFICANT CHANGE UP (ref 0–7)
KETONES UR-MCNC: ABNORMAL
LEUKOCYTE ESTERASE UR-ACNC: NEGATIVE — SIGNIFICANT CHANGE UP
NITRITE UR-MCNC: NEGATIVE — SIGNIFICANT CHANGE UP
PH UR: 7.5 — SIGNIFICANT CHANGE UP (ref 5–8)
PROT UR-MCNC: NEGATIVE — SIGNIFICANT CHANGE UP
RBC CASTS # UR COMP ASSIST: 0 /HPF — SIGNIFICANT CHANGE UP (ref 0–4)
RH IG SCN BLD-IMP: POSITIVE — SIGNIFICANT CHANGE UP
RSV RNA NPH QL NAA+NON-PROBE: SIGNIFICANT CHANGE UP
SARS-COV-2 RNA SPEC QL NAA+PROBE: SIGNIFICANT CHANGE UP
SP GR SPEC: 1.01 — SIGNIFICANT CHANGE UP (ref 1.01–1.05)
UROBILINOGEN FLD QL: SIGNIFICANT CHANGE UP
WBC UR QL: 0 /HPF — SIGNIFICANT CHANGE UP (ref 0–5)

## 2022-12-12 PROCEDURE — 74018 RADEX ABDOMEN 1 VIEW: CPT | Mod: 26

## 2022-12-12 PROCEDURE — 88304 TISSUE EXAM BY PATHOLOGIST: CPT | Mod: 26

## 2022-12-12 DEVICE — CATH REDDICK W/TROCAR 4FRX50CM: Type: IMPLANTABLE DEVICE | Status: FUNCTIONAL

## 2022-12-12 DEVICE — CLIP APPLIER COVIDIEN ENDOCLIP II 10MM MED/LG: Type: IMPLANTABLE DEVICE | Status: FUNCTIONAL

## 2022-12-12 RX ORDER — OXYCODONE HYDROCHLORIDE 5 MG/1
10 TABLET ORAL EVERY 4 HOURS
Refills: 0 | Status: DISCONTINUED | OUTPATIENT
Start: 2022-12-12 | End: 2022-12-13

## 2022-12-12 RX ORDER — SIMVASTATIN 20 MG/1
40 TABLET, FILM COATED ORAL AT BEDTIME
Refills: 0 | Status: DISCONTINUED | OUTPATIENT
Start: 2022-12-12 | End: 2022-12-12

## 2022-12-12 RX ORDER — ATORVASTATIN CALCIUM 80 MG/1
40 TABLET, FILM COATED ORAL AT BEDTIME
Refills: 0 | Status: DISCONTINUED | OUTPATIENT
Start: 2022-12-12 | End: 2022-12-15

## 2022-12-12 RX ORDER — HYDROMORPHONE HYDROCHLORIDE 2 MG/ML
0.5 INJECTION INTRAMUSCULAR; INTRAVENOUS; SUBCUTANEOUS
Refills: 0 | Status: DISCONTINUED | OUTPATIENT
Start: 2022-12-12 | End: 2022-12-12

## 2022-12-12 RX ORDER — PANTOPRAZOLE SODIUM 20 MG/1
40 TABLET, DELAYED RELEASE ORAL DAILY
Refills: 0 | Status: DISCONTINUED | OUTPATIENT
Start: 2022-12-12 | End: 2022-12-13

## 2022-12-12 RX ORDER — OXYCODONE HYDROCHLORIDE 5 MG/1
5 TABLET ORAL EVERY 4 HOURS
Refills: 0 | Status: DISCONTINUED | OUTPATIENT
Start: 2022-12-12 | End: 2022-12-13

## 2022-12-12 RX ORDER — ACETAMINOPHEN 500 MG
975 TABLET ORAL EVERY 6 HOURS
Refills: 0 | Status: DISCONTINUED | OUTPATIENT
Start: 2022-12-12 | End: 2022-12-13

## 2022-12-12 RX ORDER — HYDROMORPHONE HYDROCHLORIDE 2 MG/ML
0.5 INJECTION INTRAMUSCULAR; INTRAVENOUS; SUBCUTANEOUS ONCE
Refills: 0 | Status: DISCONTINUED | OUTPATIENT
Start: 2022-12-12 | End: 2022-12-12

## 2022-12-12 RX ORDER — OXYCODONE HYDROCHLORIDE 5 MG/1
5 TABLET ORAL EVERY 4 HOURS
Refills: 0 | Status: DISCONTINUED | OUTPATIENT
Start: 2022-12-12 | End: 2022-12-12

## 2022-12-12 RX ORDER — SODIUM CHLORIDE 9 MG/ML
1000 INJECTION, SOLUTION INTRAVENOUS
Refills: 0 | Status: DISCONTINUED | OUTPATIENT
Start: 2022-12-12 | End: 2022-12-12

## 2022-12-12 RX ORDER — OXYCODONE HYDROCHLORIDE 5 MG/1
10 TABLET ORAL ONCE
Refills: 0 | Status: DISCONTINUED | OUTPATIENT
Start: 2022-12-12 | End: 2022-12-12

## 2022-12-12 RX ORDER — IBUPROFEN 200 MG
400 TABLET ORAL EVERY 6 HOURS
Refills: 0 | Status: DISCONTINUED | OUTPATIENT
Start: 2022-12-12 | End: 2022-12-12

## 2022-12-12 RX ORDER — ONDANSETRON 8 MG/1
4 TABLET, FILM COATED ORAL ONCE
Refills: 0 | Status: DISCONTINUED | OUTPATIENT
Start: 2022-12-12 | End: 2022-12-12

## 2022-12-12 RX ORDER — FENTANYL CITRATE 50 UG/ML
25 INJECTION INTRAVENOUS
Refills: 0 | Status: DISCONTINUED | OUTPATIENT
Start: 2022-12-12 | End: 2022-12-12

## 2022-12-12 RX ORDER — PANTOPRAZOLE SODIUM 20 MG/1
40 TABLET, DELAYED RELEASE ORAL
Refills: 0 | Status: DISCONTINUED | OUTPATIENT
Start: 2022-12-12 | End: 2022-12-13

## 2022-12-12 RX ORDER — ENOXAPARIN SODIUM 100 MG/ML
40 INJECTION SUBCUTANEOUS EVERY 24 HOURS
Refills: 0 | Status: DISCONTINUED | OUTPATIENT
Start: 2022-12-12 | End: 2022-12-13

## 2022-12-12 RX ORDER — HYDROMORPHONE HYDROCHLORIDE 2 MG/ML
1 INJECTION INTRAMUSCULAR; INTRAVENOUS; SUBCUTANEOUS
Refills: 0 | Status: DISCONTINUED | OUTPATIENT
Start: 2022-12-12 | End: 2022-12-12

## 2022-12-12 RX ORDER — HYDROMORPHONE HYDROCHLORIDE 2 MG/ML
0.5 INJECTION INTRAMUSCULAR; INTRAVENOUS; SUBCUTANEOUS ONCE
Refills: 0 | Status: DISCONTINUED | OUTPATIENT
Start: 2022-12-12 | End: 2022-12-13

## 2022-12-12 RX ORDER — SODIUM CHLORIDE 9 MG/ML
1000 INJECTION, SOLUTION INTRAVENOUS
Refills: 0 | Status: DISCONTINUED | OUTPATIENT
Start: 2022-12-12 | End: 2022-12-14

## 2022-12-12 RX ORDER — PANTOPRAZOLE SODIUM 20 MG/1
40 TABLET, DELAYED RELEASE ORAL DAILY
Refills: 0 | Status: DISCONTINUED | OUTPATIENT
Start: 2022-12-12 | End: 2022-12-12

## 2022-12-12 RX ADMIN — ENOXAPARIN SODIUM 40 MILLIGRAM(S): 100 INJECTION SUBCUTANEOUS at 05:03

## 2022-12-12 RX ADMIN — SODIUM CHLORIDE 75 MILLILITER(S): 9 INJECTION, SOLUTION INTRAVENOUS at 12:52

## 2022-12-12 RX ADMIN — Medication 975 MILLIGRAM(S): at 15:35

## 2022-12-12 RX ADMIN — OXYCODONE HYDROCHLORIDE 5 MILLIGRAM(S): 5 TABLET ORAL at 18:51

## 2022-12-12 RX ADMIN — Medication 975 MILLIGRAM(S): at 15:05

## 2022-12-12 RX ADMIN — OXYCODONE HYDROCHLORIDE 5 MILLIGRAM(S): 5 TABLET ORAL at 15:05

## 2022-12-12 RX ADMIN — HYDROMORPHONE HYDROCHLORIDE 0.5 MILLIGRAM(S): 2 INJECTION INTRAMUSCULAR; INTRAVENOUS; SUBCUTANEOUS at 12:52

## 2022-12-12 RX ADMIN — PANTOPRAZOLE SODIUM 40 MILLIGRAM(S): 20 TABLET, DELAYED RELEASE ORAL at 13:30

## 2022-12-12 RX ADMIN — SODIUM CHLORIDE 75 MILLILITER(S): 9 INJECTION, SOLUTION INTRAVENOUS at 13:34

## 2022-12-12 RX ADMIN — Medication 975 MILLIGRAM(S): at 21:56

## 2022-12-12 RX ADMIN — OXYCODONE HYDROCHLORIDE 5 MILLIGRAM(S): 5 TABLET ORAL at 19:26

## 2022-12-12 RX ADMIN — OXYCODONE HYDROCHLORIDE 5 MILLIGRAM(S): 5 TABLET ORAL at 15:35

## 2022-12-12 RX ADMIN — SODIUM CHLORIDE 125 MILLILITER(S): 9 INJECTION, SOLUTION INTRAVENOUS at 05:03

## 2022-12-12 RX ADMIN — OXYCODONE HYDROCHLORIDE 5 MILLIGRAM(S): 5 TABLET ORAL at 22:42

## 2022-12-12 RX ADMIN — HYDROMORPHONE HYDROCHLORIDE 0.5 MILLIGRAM(S): 2 INJECTION INTRAMUSCULAR; INTRAVENOUS; SUBCUTANEOUS at 13:00

## 2022-12-12 NOTE — CONSULT NOTE ADULT - ASSESSMENT
46 yo M w/ hx cholelithiases gastritis, PUD, p/w diffuse abdominal pain and chest pain. Pain attributed to cholelithiasis. Today, s/p lap chata w/ IOC which demonstrated a gallstone at the ampulla. Post-op patient continues to endorse same epigastric pain radiating to the back.    #PUD on PPI  #S/p lap chata  #Ampullary stone: IOC shows filling defect at ampulla. Tbili 1.4///    Recommendations  - please keep NPO after MN for possible ERCP tomorrow  - please obtain CBC/CMP at 4 am and replete electrolytes as necessary prior to procedure    All recommendations are tentative until note is attested by attending.     Carrie Judd, PGY5  Gastroenterology/Hepatology Fellow  Available on Microsoft Teams  [phone] ([employer] Short Range Pager)  [phone] (Long Range Pager)    After 5pm, please contact the on-call GI fellow. [phone]

## 2022-12-12 NOTE — H&P ADULT - ASSESSMENT
47M with hx biliary colic and gastritis p/w abdominal pain, imaging c/w cholelithiases without cholecystitis, labs significant for potential choledocho    -admit to surgery, Dr. López  -NPO/IVF  -trend LFTs  -plan for OR today for lap chata with IOC  -consent in chart    Discussed with Dr. Maribel Chávez PGY3  B team surgery  [id number]

## 2022-12-12 NOTE — PATIENT PROFILE ADULT - FALL HARM RISK - HARM RISK INTERVENTIONS

## 2022-12-12 NOTE — CHART NOTE - NSCHARTNOTEFT_GEN_A_CORE
Post Operative Note  Patient: REYES, JEFFERSON 47y (1975) Male   MRN: [id number]  Location: [facility] 28  Visit: 12-12-22 Inpatient  Date: 12-12-22 @ 17:05    Procedure: S/P laparoscopic cholecystectomy.    Subjective: Patient seen and examined post operatively. Patient reports pain and tenderness to the RUQ. Denies nausea, vomiting, fever, chills, chest pain, SOB, cough.      Objective:  Vitals: T(F): 97.2 (12-12-22 @ 15:00), Max: 98.4 (12-12-22 @ 05:57)  HR: 71 (12-12-22 @ 16:45)  BP: 106/60 (12-12-22 @ 16:30) (106/60 - 145/90)  RR: 20 (12-12-22 @ 16:45)  SpO2: 100% (12-12-22 @ 16:45)      In:   12-12-22 @ 07:01  -  12-12-22 @ 17:05  --------------------------------------------------------  IN: 300 mL      IV Fluids: lactated ringers. 1000 milliLiter(s) (125 mL/Hr) IV Continuous <Continuous>  lactated ringers. 1000 milliLiter(s) (75 mL/Hr) IV Continuous <Continuous>      Out:   12-12-22 @ 07:01  -  12-12-22 @ 17:05  --------------------------------------------------------  OUT: 600 mL        Voided Urine:   12-12-22 @ 07:01  -  12-12-22 @ 17:05  --------------------------------------------------------  OUT: 600 mL      Posada Catheter: no     Physical Examination:  General: NAD, resting comfortably in bed  HEENT: Normocephalic atraumatic  Respiratory: Nonlabored respirations, normal CW expansion.  Abdomen: softly distended, tender to the RUQ, surgical incisions are c/d/i  Vascular: extremities are warm and well perfused.     Imaging:  No post-op imaging studies    Assessment:  47yMale patient S/P laparoscopic cholecystectomy, IOC intraoperatively showed a gallstone at ampulla.    Plan:  - GI consult for ERCP  - Pain control PRN  - Diet: NPO  - IVF  - Activity: OOB as burak  - DVT ppx: SCD's & tino    Date/Time: 12-12-22 @ 17:05

## 2022-12-12 NOTE — H&P ADULT - HISTORY OF PRESENT ILLNESS
47M w/ hx cholelithiases gastritis, PUD, p/w diffuse abd pain and chest pain. Pain started around 5pm yesterday, preceded by eating a piece of salmon. Pain diffuse and radiated to mid back, described as burning. Endorses some nausea, no emesis or diarrhea. His last 2 BMs have been dark grey to black in color. Patient has been to the ED 5 times over the last year for similar pain. Each workup demonstrated gallstones but no associated cholecystitis, HIDA last Feb was negative. Patient has been worked up by GI and found to have gastritis, on omeprazole. Patient was planning to see a surgeon in 2 days.

## 2022-12-12 NOTE — H&P ADULT - NSHPLABSRESULTS_GEN_ALL_CORE
15.4   10.36 )-----------( 318      ( 11 Dec 2022 21:27 )             43.2     12    136  |  96<L>  |  13  ----------------------------<  93  4.0   |  26  |  0.94    Ca    9.6      11 Dec 2022 21:27    TPro  7.5  /  Alb  4.8  /  TBili  1.4<H>  /  DBili  x   /  AST  192<H>  /  ALT  213<H>  /  AlkPhos  213<H>  12    LIVER FUNCTIONS - ( 11 Dec 2022 21:27 )  Alb: 4.8 g/dL / Pro: 7.5 g/dL / ALK PHOS: 213 U/L / ALT: 213 U/L / AST: 192 U/L / GGT: x           PT/INR - ( 11 Dec 2022 21:27 )   PT: 12.8 sec;   INR: 1.10 ratio         PTT - ( 11 Dec 2022 21:27 )  PTT:37.4 sec  Urinalysis Basic - ( 11 Dec 2022 22:30 )    Color: Yellow / Appearance: Clear / S.013 / pH: x  Gluc: x / Ketone: Moderate  / Bili: Negative / Urobili: <2 mg/dL   Blood: x / Protein: Negative / Nitrite: Negative   Leuk Esterase: Negative / RBC: 0 /HPF / WBC 0 /HPF   Sq Epi: x / Non Sq Epi: 0 /HPF / Bacteria: Negative      < from: CT Abdomen and Pelvis w/ IV Cont (22 @ 23:47) >    FINDINGS:  LOWER CHEST: Within normal limits.    LIVER: Within normal limits.  BILE DUCTS: Normal caliber.  GALLBLADDER: Cholelithiasis. The gallbladder is contracted. The   gallbladder wall measures 3 mm.  SPLEEN: Within normal limits.  PANCREAS: Within normal limits.  ADRENALS: Within normal limits.  KIDNEYS/URETERS: Symmetric enhancement. No hydronephrosis.    BLADDER: Within normal limits.  REPRODUCTIVE ORGANS: Prostate within normal limits.    BOWEL: No bowel obstruction. Appendix contains appendicoliths, new since   prior exam, but is normal in caliber without periappendiceal fat   stranding.  PERITONEUM: No ascites.  VESSELS: Mild atherosclerotic changes.  RETROPERITONEUM/LYMPH NODES: No lymphadenopathy.  ABDOMINALWALL: Within normal limits.  BONES: Within normal limits.    IMPRESSION:    Cholelithiasis without CT evidence of acute cholecystitis. The   gallbladder is contracted similar to prior exam, limiting its evaluation.    Interval development of appendicoliths without secondary signs for acute   appendicitis.    < end of copied text >    < from: US Abdomen Upper Quadrant Right (22 @ 22:15) >    FINDINGS:  Liver: Within normal limits.  Bile ducts: Normal caliber. Common bile duct measures 3 mm.  Gallbladder: Cholelithiasis. Gallbladder wall measures 3 mm. No   pericholecystic fluid. Negative sonographic Royal sign (Tylenol was   administered for pain).  Pancreas: Not visualized.  Right kidney: 11.0 cm. No hydronephrosis.  Ascites: None.  IVC: Visualized portions are within normal limits.    IMPRESSION:    Cholelithiasis without sonographic evidence for acute cholecystitis.    < end of copied text >

## 2022-12-12 NOTE — BRIEF OPERATIVE NOTE - NSICDXBRIEFPROCEDURE_GEN_ALL_CORE_FT
PROCEDURES:  Laparoscopic cholecystectomy 12-Dec-2022 12:28:39  Sd Matos  Intraoperative cholangiogram 12-Dec-2022 12:28:53  Sd Matos

## 2022-12-12 NOTE — H&P ADULT - NSHPPHYSICALEXAM_GEN_ALL_CORE
Office Cardiology Progress Note  Olvin Mccabe 69 y o  female MRN: [id number]  07/01/20  8:55 AM      ASSESSMENT:    1  Improved chronic exertional dyspnea with stair climbing but not with exercise at [facility], probably related to progressive overweight with 13 pound weight gain over the past 7-1/2+ years  2  Questionable control of  essential hypertension with history of white coat phenomenon  3  Uncontrolled dyslipidemia with atorvastatin, occasionally miss, as well as dietary indiscretion and lack of exercise  4  History of normal exercise stress test 10/13/15 and benign echocardiogram on 5/1/13   5  History of chronic nonproductive cough, possibly caused by GERD, with patient currently on Pepcid every 2nd or 3rd day  Cough has recently been less frequent  6  Resolved hypercalcemia with parathyroidectomy 11/30/11   7  Status post nasal polypectomy October, 2012 and [facility]  October, 2012 with right cataract extraction 9/13  8  Chronic stable multinodular goiter with latest thyroid ultrasound 5/30/17, followed by Dr Villafana  9  Mild chronic kidney disease, stage III  Current estimated GFR is 54    10  Chronic fatigue and tiredness with consideration of adverse drug reaction to verapamil  11  Osteoarthritis of both knees, right more than left  Plan       Patient Instructions       1  Take blood pressures at home after resting for at least 15 minutes while seated in a chair or at a table with arm supported on arm rest or table  Do 3 readings, one after the other, both morning and evening for 4 consecutive days  2   Write down each and every reading with date and time and get list of readings to the office of Dr Alo Linares for his review  3   Our office will contact you with further instructions and the results of this review  The  4  Begin outdoors walking during the cool part of the day for a total duration of 45-60 minutes at least four days a week or more, if possible    5  We will recheck lipid panel in approximately three months and consider an increase in dose of atorvastatin if LDL cholesterol has not improved significantly with increased exercise and better dietary adherence  6  Cardiology follow-up approximately six months with EKG, lipids, CMP  HPI    This 69 y o  female  denies new cardiopulmonary and medical symptoms  The patient has been relatively sedentary for the past three and half months because of the pandemic and lack of [address] access at [facility], which she had previously been doing five days a week  The patient admits to forgetting to take her medication once a week every week or two and admits to a chronically unhealthy diet at home  She has been doing very little walking on a consistent basis and no home exercise to speak of  The patient is being seen in follow-up of the below listed diagnoses  Encounter Diagnoses   Name Primary?     Essential hypertension Yes    Dyspnea on exertion     Dyslipidemia     Intermittent palpitations     Chronic kidney disease, stage III (moderate) (HCC)     Multinodular goiter         Review of Systems    All other systems negative, except as noted in history of present illness    Historical Information   Past Medical History:   Diagnosis Date    Borderline high cholesterol     Borderline hypertension     Migraine      Past Surgical History:   Procedure Laterality Date    BREAST BIOPSY Right     benign    BUNIONECTOMY      CATARACT EXTRACTION, BILATERAL      PARATHYROID GLAND SURGERY       Social History     Substance and Sexual Activity   Alcohol Use No     Social History     Substance and Sexual Activity   Drug Use No     Social History     Tobacco Use   Smoking Status Never Smoker   Smokeless Tobacco Never Used       Family History:  Family History   Problem Relation Age of Onset    Angina Mother     Heart failure Mother     Prostate cancer Father     Diabetes Sister     Sudden death Sister    [facility] Osteoporosis Sister     Hypertension Brother     No Known Problems Son     No Known Problems Daughter     Prostate cancer Brother     Lung cancer Brother     Sudden death Brother    [facility] COPD Sister     Breast cancer Maternal Uncle     Breast cancer Cousin          Meds/Allergies     Prior to Admission medications    Medication Sig Start Date End Date Taking?  Authorizing Provider   Ascorbic Acid (VITAMIN C) 500 MG CAPS Take 1 tablet by mouth daily 10/18/17  Yes Historical Provider, MD   atorvastatin (LIPITOR) 20 mg tablet take 1 tablet by mouth once daily 5/4/20  Yes Mario Hood MD   B Complex Vitamins (B COMPLEX PO) Take 1 tablet by mouth daily   Yes Historical Provider, MD   chlorthalidone 25 mg tablet take 1 tablet by mouth 4 days A WEEK EVERY MONDAY, WEDNESDAY, Ysitie 30, AND MYNOR 3/30/20  Yes Mario Hood MD   Cholecalciferol (VITAMIN D3) 5000 units CAPS Take 1 capsule by mouth Daily   Yes Historical Provider, MD   famotidine (PEPCID) 40 MG tablet Take 1 tablet by mouth every other day     Yes Historical Provider, MD   gabapentin (NEURONTIN) 300 mg capsule take 1 capsule by mouth twice a day 5/5/20  Yes Sheldon Rubio DPM   magnesium oxide (MAG-OX) 400 mg tablet Take 1 tablet by mouth daily With zinc  12/2/10  Yes Historical Provider, MD   Misc Natural Products (GLUCOSAMINE CHOND COMPLEX/MSM PO) Take 1 tablet by mouth Daily 1000 mg    Yes Historical Provider, MD   Multiple Vitamin (MULTI-VITAMIN DAILY PO) Take 1 tablet by mouth daily 10/18/17  Yes Historical Provider, MD   Multiple Vitamins-Minerals (PRESERVISION AREDS) CAPS Take 1 capsule by mouth daily 10/18/17  Yes Historical Provider, MD   Multiple Vitamins-Minerals (ZINC PO) Take by mouth daily Pt takes with magnesium   Yes Historical Provider, MD   Omega-3 Fatty Acids (FISH OIL) 500 MG CAPS Take 1 capsule by mouth daily 10/18/17  Yes Historical Provider, MD   Probiotic Product (PROBIOTIC-10 PO) Take 1 capsule by mouth daily   Yes Historical Provider, MD   verapamil (CALAN-SR) 120 mg CR tablet Take 1 tablet (120 mg total) by mouth 2 (two) times a day 6/30/20  Yes JANETT Roper       No Known Allergies      Vitals:    07/01/20 0802   BP: 138/78   BP Location: Left arm   Patient Position: Sitting   Cuff Size: Standard   Pulse: (!) 54   Temp: 98 7 °F (37 1 °C)   SpO2: 98%   Weight: 81 6 kg (180 lb)   Height: 5' 10" (1 778 m)       Body mass index is 25 83 kg/m²  2 pound weight loss in approximately eight months    Physical Exam:    General Appearance:  Alert, cooperative, no distress, appears stated age   Head:  Normocephalic, without obvious abnormality, atraumatic   Eyes:  PERRL, conjunctiva/corneas clear, EOM's intact,   both eyes   Ears:  Normal TM's and external ear canals, both ears   Nose: Nares normal, septum midline, mucosa normal, no drainage or sinus tenderness   Throat: Lips, mucosa, and tongue normal; teeth and gums normal   Neck: Supple, symmetrical, trachea midline, no adenopathy, thyroid: not enlarged, symmetric, no tenderness/mass/nodules, no carotid bruit or JVD   Back:   Symmetric, no curvature, ROM normal, no CVA tenderness   Lungs:   Clear to auscultation bilaterally, respirations unlabored   Chest Wall:  No tenderness or deformity   Heart:  Regular rate and rhythm, S1, S2 normal, no murmur, rub or gallop   Abdomen:   Soft, non-tender, bowel sounds active all four quadrants,  no masses, no organomegaly   Extremities: Extremities normal, atraumatic, no cyanosis or edema   Pulses: 2+ and symmetric   Skin: Skin showed normal color, texture, turgor and no rashes or lesions   Lymph nodes: Cervical, supraclavicular, and axillary nodes normal   Neurologic: Normal         Cardiographics    ECG 07/01/20:    Sinus bradycardia and sinus arrhythmia  Slower heart rate by 9 bpm with no other changes since 06/30/2019    IMAGING:    No Chest XR results available for this patient            LAB REVIEW:      Lab Results   Component Value Date SODIUM 138 06/04/2020    K 3 4 (L) 06/04/2020     06/04/2020    CO2 28 06/04/2020    ANIONGAP 11 7 10/09/2015    BUN 26 (H) 06/04/2020    CREATININE 1 05 06/04/2020    GLUCOSE 94 01/03/2017    GLUF 87 06/04/2020    CALCIUM 9 6 06/04/2020    AST 11 06/04/2020    ALT 21 06/04/2020    ALKPHOS 46 06/04/2020    PROT 6 5 01/03/2017    BILITOT 0 3 01/03/2017    EGFR 54 06/04/2020     Lab Results   Component Value Date    CHOLESTEROL 173 06/04/2020    CHOLESTEROL 139 10/23/2019    CHOLESTEROL 135 07/19/2019     Lab Results   Component Value Date    HDL 50 06/04/2020    HDL 57 10/23/2019    HDL 61 07/19/2019     No results found for: LDLCHOLEST  Lab Results   Component Value Date    LDLCALC 103 (H) 06/04/2020    LDLCALC 57 10/23/2019    LDLCALC 58 07/19/2019     No components found for: [facility]  Lab Results   Component Value Date    TRIG 102 06/04/2020    TRIG 127 10/23/2019    TRIG 80 07/19/2019               Yuri Cohen MD T(C): 36.3 (12-12-22 @ 00:53), Max: 36.8 (12-11-22 @ 18:24)  HR: 84 (12-12-22 @ 00:53) (82 - 84)  BP: 118/79 (12-12-22 @ 00:53) (118/79 - 145/90)  RR: 16 (12-12-22 @ 00:53) (16 - 16)  SpO2: 100% (12-12-22 @ 00:53) (98% - 100%)  Wt(kg): --    Physical Exam:    General: WN/WD NAD  Neurology: A&Ox3, nonfocal, follows commands  Eyes: PERRLA/ EOMI  ENT/Neck: Neck supple, trachea midline, No JVD  Respiratory: CTA B/L, No wheezing, rales, rhonchi  CV: Normal rate regular rhythm, S1S2, no murmurs, rubs or gallops  Abdominal: Soft, ND, tender around umbilicus and lower abdomen  Extremities: No edema, + peripheral pulses  Skin: No Rashes, Hematoma, Ecchymosis

## 2022-12-12 NOTE — H&P ADULT - ATTENDING COMMENTS
History obtained the patient was examined. Long-standing history (2/22) of abdominal pain- postprandial. denies nausea or emesis. Admits noticing a dark orange color of the urine.  Labs: Bilirubin 1.4. Elevated LFT's.  CT Scan: New fecaliths in the appendix without evidence of inflammation. Ultrasound of the gallbladder: Calculi.  Plan: Laparoscopic, possible open cholecystectomy with IOC, possible ERCP discussed in detail with the patient including risks: hemorrhage, infection, bile duct/intestinal injuries, pancreatitis. He was advised to seek medical attention promptly if in the future he were to develop right lower quadrant abdominal pain.  The patient understands the planned treatment and agrees to proceed with it.

## 2022-12-12 NOTE — CONSULT NOTE ADULT - SUBJECTIVE AND OBJECTIVE BOX
HPI:      Allergies:  No Known Allergies    Home Medications:  omeprazole 20 mg oral delayed release tablet: 1 tab(s) orally once a day (12 Dec 2022 04:54)  simvastatin 40 mg oral tablet: 1 tab(s) orally once a day (at bedtime) (12 Dec 2022 03:04)    Hospital Medications:  acetaminophen     Tablet .. 975 milliGRAM(s) Oral every 6 hours  enoxaparin Injectable 40 milliGRAM(s) SubCutaneous every 24 hours  fentaNYL    Injectable 25 MICROGram(s) IV Push every 5 minutes PRN  HYDROmorphone  Injectable 0.5 milliGRAM(s) IV Push every 10 minutes PRN  HYDROmorphone  Injectable 1 milliGRAM(s) IV Push every 10 minutes PRN  ibuprofen  Tablet. 400 milliGRAM(s) Oral every 6 hours  lactated ringers. 1000 milliLiter(s) IV Continuous <Continuous>  lactated ringers. 1000 milliLiter(s) IV Continuous <Continuous>  ondansetron Injectable 4 milliGRAM(s) IV Push once PRN  oxyCODONE    IR 5 milliGRAM(s) Oral every 4 hours PRN  pantoprazole    Tablet 40 milliGRAM(s) Oral before breakfast  pantoprazole  Injectable 40 milliGRAM(s) IV Push daily  simvastatin 40 milliGRAM(s) Oral at bedtime    PMHX/PSHX:  HLD (hyperlipidemia)    Gallstones    Peptic ulcer disease    No significant past surgical history    Family history:  Family history of diabetes mellitus (DM)    FH: HTN (hypertension)    FH: hyperlipidemia    Denies family history of colon cancer/polyps, stomach cancer/polyps, pancreatic cancer/masses, liver cancer/disease, ovarian cancer and endometrial cancer.    Social History:   Tob: Denies  EtOH: Denies  Illicit Drugs: Denies    ROS:   General:  No wt loss, fevers, chills, night sweats, fatigue  Eyes:  Good vision, no reported pain  ENT:  No sore throat, pain, runny nose, dysphagia  CV:  No pain, palpitations, hypo/hypertension  Pulm:  No dyspnea, cough, tachypnea, wheezing  GI:  see HPI  :  No pain, bleeding, incontinence, nocturia  Muscle:  No pain, weakness  Neuro:  No weakness, tingling, memory problems  Psych:  No fatigue, insomnia, mood problems, depression  Endocrine:  No polyuria, polydipsia, cold/heat intolerance  Heme:  No petechiae, ecchymosis, easy bruisability  Skin:  No rash, tattoos, scars, edema    PHYSICAL EXAM:     GENERAL:  No acute distress  HEENT:  NCAT, no scleral icterus   CHEST:  no respiratory distress  HEART:  Regular rate and rhythm  ABDOMEN:  Soft, non-tender, non-distended, normoactive bowel sounds,  no masses  EXTREMITIES: No edema  SKIN:  No rash/erythema/ecchymoses/petechiae/wounds/abscess/warm/dry  NEURO:  Alert and oriented x 3, no asterixis    Vital Signs:  Vital Signs Last 24 Hrs  T(C): 36.2 (12 Dec 2022 15:00), Max: 36.9 (12 Dec 2022 05:57)  T(F): 97.2 (12 Dec 2022 15:00), Max: 98.4 (12 Dec 2022 05:57)  HR: 75 (12 Dec 2022 15:30) (57 - 84)  BP: 121/70 (12 Dec 2022 15:30) (112/76 - 145/90)  BP(mean): 81 (12 Dec 2022 15:30) (80 - 92)  RR: 20 (12 Dec 2022 15:30) (13 - 20)  SpO2: 100% (12 Dec 2022 15:30) (98% - 100%)    Parameters below as of 12 Dec 2022 15:00  Patient On (Oxygen Delivery Method): room air      Daily Height in cm: 160.02 (12 Dec 2022 08:34)    Daily     LABS:                        15.4   10.36 )-----------( 318      ( 11 Dec 2022 21:27 )             43.2     Mean Cell Volume: 91.5 fL (22 @ 21:27)        136  |  96<L>  |  13  ----------------------------<  93  4.0   |  26  |  0.94    Ca    9.6      11 Dec 2022 21:27    TPro  7.5  /  Alb  4.8  /  TBili  1.4<H>  /  DBili  x   /  AST  192<H>  /  ALT  213<H>  /  AlkPhos  213<H>      LIVER FUNCTIONS - ( 11 Dec 2022 21:27 )  Alb: 4.8 g/dL / Pro: 7.5 g/dL / ALK PHOS: 213 U/L / ALT: 213 U/L / AST: 192 U/L / GGT: x           PT/INR - ( 11 Dec 2022 21:27 )   PT: 12.8 sec;   INR: 1.10 ratio         PTT - ( 11 Dec 2022 21:27 )  PTT:37.4 sec  Urinalysis Basic - ( 11 Dec 2022 22:30 )    Color: Yellow / Appearance: Clear / S.013 / pH: x  Gluc: x / Ketone: Moderate  / Bili: Negative / Urobili: <2 mg/dL   Blood: x / Protein: Negative / Nitrite: Negative   Leuk Esterase: Negative / RBC: 0 /HPF / WBC 0 /HPF   Sq Epi: x / Non Sq Epi: 0 /HPF / Bacteria: Negative      Amylase Serum--      Lipase serum46       Ammonia--                          15.4   10.36 )-----------( 318      ( 11 Dec 2022 21:27 )             43.2       Imaging:  reviewed           HPI:  48 yo M w/ hx cholelithiases gastritis, PUD, p/w diffuse abdominal pain and chest pain. Pain started around 5pm on , preceded by eating a piece of salmon. Pain was diffuse and radiated to mid back, described as burning. Endorses some nausea, no emesis or diarrhea. Patient has been to the ED 5 times over the last year for similar pain. Each workup demonstrated gallstones but no associated cholecystitis, HIDA last Feb was negative. Patient has been worked up by GI and found to have gastritis, on omeprazole.  Today, patient underwent lap chata w/ IOC which demonstrated a gallstone at the ampulla. Post op patient continues to endorse same epigastric pain radiating to the back.    Allergies:  No Known Allergies    Home Medications:  omeprazole 20 mg oral delayed release tablet: 1 tab(s) orally once a day (12 Dec 2022 04:54)  simvastatin 40 mg oral tablet: 1 tab(s) orally once a day (at bedtime) (12 Dec 2022 03:04)    Hospital Medications:  acetaminophen     Tablet .. 975 milliGRAM(s) Oral every 6 hours  enoxaparin Injectable 40 milliGRAM(s) SubCutaneous every 24 hours  fentaNYL    Injectable 25 MICROGram(s) IV Push every 5 minutes PRN  HYDROmorphone  Injectable 0.5 milliGRAM(s) IV Push every 10 minutes PRN  HYDROmorphone  Injectable 1 milliGRAM(s) IV Push every 10 minutes PRN  ibuprofen  Tablet. 400 milliGRAM(s) Oral every 6 hours  lactated ringers. 1000 milliLiter(s) IV Continuous <Continuous>  lactated ringers. 1000 milliLiter(s) IV Continuous <Continuous>  ondansetron Injectable 4 milliGRAM(s) IV Push once PRN  oxyCODONE    IR 5 milliGRAM(s) Oral every 4 hours PRN  pantoprazole    Tablet 40 milliGRAM(s) Oral before breakfast  pantoprazole  Injectable 40 milliGRAM(s) IV Push daily  simvastatin 40 milliGRAM(s) Oral at bedtime    PMHX/PSHX:  HLD (hyperlipidemia)    Gallstones    Peptic ulcer disease    No significant past surgical history    Family history:  Family history of diabetes mellitus (DM)    FH: HTN (hypertension)    FH: hyperlipidemia    Denies family history of colon cancer/polyps, stomach cancer/polyps, pancreatic cancer/masses, liver cancer/disease, ovarian cancer and endometrial cancer.    Social History:   Tob: Denies  EtOH: Denies  Illicit Drugs: Denies    ROS:   General:  No wt loss, fevers, chills, night sweats, fatigue  Eyes:  Good vision, no reported pain  ENT:  No sore throat, pain, runny nose, dysphagia  CV:  No pain, palpitations, hypo/hypertension  Pulm:  No dyspnea, cough, tachypnea, wheezing  GI:  see HPI  :  No pain, bleeding, incontinence, nocturia  Muscle:  No pain, weakness  Neuro:  No weakness, tingling, memory problems  Psych:  No fatigue, insomnia, mood problems, depression  Endocrine:  No polyuria, polydipsia, cold/heat intolerance  Heme:  No petechiae, ecchymosis, easy bruisability  Skin:  No rash, tattoos, scars, edema    PHYSICAL EXAM:   GENERAL:  No acute distress  HEENT:  NCAT, no scleral icterus   CHEST:  no respiratory distress  HEART:  Regular rate and rhythm  ABDOMEN:  Soft, non-tender, non-distended   EXTREMITIES: No edema  SKIN:  No rash/erythema/ecchymoses   NEURO:  Alert and oriented x 3     Vital Signs:  Vital Signs Last 24 Hrs  T(C): 36.2 (12 Dec 2022 15:00), Max: 36.9 (12 Dec 2022 05:57)  T(F): 97.2 (12 Dec 2022 15:00), Max: 98.4 (12 Dec 2022 05:57)  HR: 75 (12 Dec 2022 15:30) (57 - 84)  BP: 121/70 (12 Dec 2022 15:30) (112/76 - 145/90)  BP(mean): 81 (12 Dec 2022 15:30) (80 - 92)  RR: 20 (12 Dec 2022 15:30) (13 - 20)  SpO2: 100% (12 Dec 2022 15:30) (98% - 100%)    Parameters below as of 12 Dec 2022 15:00  Patient On (Oxygen Delivery Method): room air      Daily Height in cm: 160.02 (12 Dec 2022 08:34)    Daily     LABS:                        15.4   10.36 )-----------( 318      ( 11 Dec 2022 21:27 )             43.2     Mean Cell Volume: 91.5 fL (-22 @ 21:27)        136  |  96<L>  |  13  ----------------------------<  93  4.0   |  26  |  0.94    Ca    9.6      11 Dec 2022 21:27    TPro  7.5  /  Alb  4.8  /  TBili  1.4<H>  /  DBili  x   /  AST  192<H>  /  ALT  213<H>  /  AlkPhos  213<H>      LIVER FUNCTIONS - ( 11 Dec 2022 21:27 )  Alb: 4.8 g/dL / Pro: 7.5 g/dL / ALK PHOS: 213 U/L / ALT: 213 U/L / AST: 192 U/L / GGT: x           PT/INR - ( 11 Dec 2022 21:27 )   PT: 12.8 sec;   INR: 1.10 ratio         PTT - ( 11 Dec 2022 21:27 )  PTT:37.4 sec  Urinalysis Basic - ( 11 Dec 2022 22:30 )    Color: Yellow / Appearance: Clear / S.013 / pH: x  Gluc: x / Ketone: Moderate  / Bili: Negative / Urobili: <2 mg/dL   Blood: x / Protein: Negative / Nitrite: Negative   Leuk Esterase: Negative / RBC: 0 /HPF / WBC 0 /HPF   Sq Epi: x / Non Sq Epi: 0 /HPF / Bacteria: Negative      Amylase Serum--      Lipase serum46       Ammonia--                          15.4   10.36 )-----------( 318      ( 11 Dec 2022 21:27 )             43.2       Imaging:    ACC: [id number] EXAM:  XR ABDOMEN SUPINE 1V                          PROCEDURE DATE:  2022          INTERPRETATION:  CLINICAL INFORMATION: .    EXAM: Single frontal limited intraoperative view of the abdomen from   2022 at 10:57 AM, 11:04 AM and 11:08 AM.    FINDINGS:  Contrast opacifies the major intrahepatic, common hepatic and normal   caliber common bile duct.    Filling defect at the level of the ampulla of Vater allowing only a tiny   amount of contrast into the duodenum and likely secondary to a distal   common bile duct calculus.    Free intraperitoneal air is seen, as expected with laparoscopy.    Right upper quadrant surgical clips and laparoscopic trocars are noted.    An enteric tube is seen with the distal tip in the stomach.    IMPRESSION: Choledocholithiasis.    --- End of Report ---            DARCI ESTRADA MD; Attending Radiologist  This document has been electronically signed. Dec 12 2022 11:57AM

## 2022-12-13 ENCOUNTER — TRANSCRIPTION ENCOUNTER (OUTPATIENT)
Age: 47
End: 2022-12-13

## 2022-12-13 LAB
ALBUMIN SERPL ELPH-MCNC: 4 G/DL — SIGNIFICANT CHANGE UP (ref 3.3–5)
ALP SERPL-CCNC: 145 U/L — HIGH (ref 40–120)
ALT FLD-CCNC: 152 U/L — HIGH (ref 4–41)
ANION GAP SERPL CALC-SCNC: 16 MMOL/L — HIGH (ref 7–14)
AST SERPL-CCNC: 53 U/L — HIGH (ref 4–40)
BILIRUB SERPL-MCNC: 0.5 MG/DL — SIGNIFICANT CHANGE UP (ref 0.2–1.2)
BUN SERPL-MCNC: 8 MG/DL — SIGNIFICANT CHANGE UP (ref 7–23)
CALCIUM SERPL-MCNC: 8.8 MG/DL — SIGNIFICANT CHANGE UP (ref 8.4–10.5)
CHLORIDE SERPL-SCNC: 101 MMOL/L — SIGNIFICANT CHANGE UP (ref 98–107)
CO2 SERPL-SCNC: 17 MMOL/L — LOW (ref 22–31)
CREAT SERPL-MCNC: 0.89 MG/DL — SIGNIFICANT CHANGE UP (ref 0.5–1.3)
CULTURE RESULTS: NO GROWTH — SIGNIFICANT CHANGE UP
EGFR: 108 ML/MIN/1.73M2 — SIGNIFICANT CHANGE UP
GLUCOSE SERPL-MCNC: 63 MG/DL — LOW (ref 70–99)
HCT VFR BLD CALC: 38.6 % — LOW (ref 39–50)
HGB BLD-MCNC: 13.5 G/DL — SIGNIFICANT CHANGE UP (ref 13–17)
MAGNESIUM SERPL-MCNC: 1.7 MG/DL — SIGNIFICANT CHANGE UP (ref 1.6–2.6)
MCHC RBC-ENTMCNC: 32.8 PG — SIGNIFICANT CHANGE UP (ref 27–34)
MCHC RBC-ENTMCNC: 35 GM/DL — SIGNIFICANT CHANGE UP (ref 32–36)
MCV RBC AUTO: 93.7 FL — SIGNIFICANT CHANGE UP (ref 80–100)
NRBC # BLD: 0 /100 WBCS — SIGNIFICANT CHANGE UP (ref 0–0)
NRBC # FLD: 0.02 K/UL — HIGH (ref 0–0)
PHOSPHATE SERPL-MCNC: 3.5 MG/DL — SIGNIFICANT CHANGE UP (ref 2.5–4.5)
PLATELET # BLD AUTO: 272 K/UL — SIGNIFICANT CHANGE UP (ref 150–400)
POTASSIUM SERPL-MCNC: 3.8 MMOL/L — SIGNIFICANT CHANGE UP (ref 3.5–5.3)
POTASSIUM SERPL-SCNC: 3.8 MMOL/L — SIGNIFICANT CHANGE UP (ref 3.5–5.3)
PROT SERPL-MCNC: 6.2 G/DL — SIGNIFICANT CHANGE UP (ref 6–8.3)
RBC # BLD: 4.12 M/UL — LOW (ref 4.2–5.8)
RBC # FLD: 12 % — SIGNIFICANT CHANGE UP (ref 10.3–14.5)
SODIUM SERPL-SCNC: 134 MMOL/L — LOW (ref 135–145)
SPECIMEN SOURCE: SIGNIFICANT CHANGE UP
WBC # BLD: 10.98 K/UL — HIGH (ref 3.8–10.5)
WBC # FLD AUTO: 10.98 K/UL — HIGH (ref 3.8–10.5)

## 2022-12-13 PROCEDURE — 74330 X-RAY BILE/PANC ENDOSCOPY: CPT | Mod: 26,GC

## 2022-12-13 PROCEDURE — 43262 ENDO CHOLANGIOPANCREATOGRAPH: CPT | Mod: 59,GC

## 2022-12-13 PROCEDURE — 43274 ERCP DUCT STENT PLACEMENT: CPT | Mod: GC

## 2022-12-13 PROCEDURE — 43264 ERCP REMOVE DUCT CALCULI: CPT | Mod: GC

## 2022-12-13 DEVICE — GWIRE JAGTOME REVOLUTION RX 260CM/0.025IN: Type: IMPLANTABLE DEVICE | Status: FUNCTIONAL

## 2022-12-13 DEVICE — STENT BIL ADVANIX 10FRX7CM PRELOADED: Type: IMPLANTABLE DEVICE | Status: FUNCTIONAL

## 2022-12-13 DEVICE — BLLN EXTRCTR PRO RX-S 9-12MM: Type: IMPLANTABLE DEVICE | Status: FUNCTIONAL

## 2022-12-13 DEVICE — CLIP HEMOSTASIS DURACLIP 11MM: Type: IMPLANTABLE DEVICE | Status: FUNCTIONAL

## 2022-12-13 RX ORDER — OXYCODONE HYDROCHLORIDE 5 MG/1
10 TABLET ORAL EVERY 4 HOURS
Refills: 0 | Status: DISCONTINUED | OUTPATIENT
Start: 2022-12-13 | End: 2022-12-15

## 2022-12-13 RX ORDER — PANTOPRAZOLE SODIUM 20 MG/1
40 TABLET, DELAYED RELEASE ORAL
Refills: 0 | Status: DISCONTINUED | OUTPATIENT
Start: 2022-12-13 | End: 2022-12-15

## 2022-12-13 RX ORDER — OXYCODONE HYDROCHLORIDE 5 MG/1
5 TABLET ORAL EVERY 4 HOURS
Refills: 0 | Status: DISCONTINUED | OUTPATIENT
Start: 2022-12-13 | End: 2022-12-15

## 2022-12-13 RX ORDER — ACETAMINOPHEN 500 MG
1000 TABLET ORAL EVERY 6 HOURS
Refills: 0 | Status: DISCONTINUED | OUTPATIENT
Start: 2022-12-13 | End: 2022-12-15

## 2022-12-13 RX ORDER — ENOXAPARIN SODIUM 100 MG/ML
40 INJECTION SUBCUTANEOUS EVERY 24 HOURS
Refills: 0 | Status: DISCONTINUED | OUTPATIENT
Start: 2022-12-13 | End: 2022-12-15

## 2022-12-13 RX ORDER — INDOMETHACIN 50 MG
100 CAPSULE ORAL ONCE
Refills: 0 | Status: DISCONTINUED | OUTPATIENT
Start: 2022-12-13 | End: 2022-12-15

## 2022-12-13 RX ORDER — HYDROMORPHONE HYDROCHLORIDE 2 MG/ML
1 INJECTION INTRAMUSCULAR; INTRAVENOUS; SUBCUTANEOUS EVERY 4 HOURS
Refills: 0 | Status: DISCONTINUED | OUTPATIENT
Start: 2022-12-13 | End: 2022-12-13

## 2022-12-13 RX ORDER — MAGNESIUM SULFATE 500 MG/ML
2 VIAL (ML) INJECTION ONCE
Refills: 0 | Status: COMPLETED | OUTPATIENT
Start: 2022-12-13 | End: 2022-12-13

## 2022-12-13 RX ORDER — HYDROMORPHONE HYDROCHLORIDE 2 MG/ML
0.5 INJECTION INTRAMUSCULAR; INTRAVENOUS; SUBCUTANEOUS EVERY 4 HOURS
Refills: 0 | Status: DISCONTINUED | OUTPATIENT
Start: 2022-12-13 | End: 2022-12-13

## 2022-12-13 RX ORDER — ACETAMINOPHEN 500 MG
1000 TABLET ORAL EVERY 6 HOURS
Refills: 0 | Status: DISCONTINUED | OUTPATIENT
Start: 2022-12-13 | End: 2022-12-13

## 2022-12-13 RX ORDER — CIPROFLOXACIN LACTATE 400MG/40ML
400 VIAL (ML) INTRAVENOUS ONCE
Refills: 0 | Status: DISCONTINUED | OUTPATIENT
Start: 2022-12-13 | End: 2022-12-15

## 2022-12-13 RX ORDER — CALCIUM CARBONATE 500(1250)
1 TABLET ORAL ONCE
Refills: 0 | Status: COMPLETED | OUTPATIENT
Start: 2022-12-13 | End: 2022-12-13

## 2022-12-13 RX ADMIN — HYDROMORPHONE HYDROCHLORIDE 1 MILLIGRAM(S): 2 INJECTION INTRAMUSCULAR; INTRAVENOUS; SUBCUTANEOUS at 09:11

## 2022-12-13 RX ADMIN — SODIUM CHLORIDE 75 MILLILITER(S): 9 INJECTION, SOLUTION INTRAVENOUS at 14:05

## 2022-12-13 RX ADMIN — Medication 975 MILLIGRAM(S): at 02:57

## 2022-12-13 RX ADMIN — Medication 25 GRAM(S): at 07:31

## 2022-12-13 RX ADMIN — OXYCODONE HYDROCHLORIDE 5 MILLIGRAM(S): 5 TABLET ORAL at 17:54

## 2022-12-13 RX ADMIN — HYDROMORPHONE HYDROCHLORIDE 0.5 MILLIGRAM(S): 2 INJECTION INTRAMUSCULAR; INTRAVENOUS; SUBCUTANEOUS at 07:12

## 2022-12-13 RX ADMIN — OXYCODONE HYDROCHLORIDE 10 MILLIGRAM(S): 5 TABLET ORAL at 14:50

## 2022-12-13 RX ADMIN — OXYCODONE HYDROCHLORIDE 10 MILLIGRAM(S): 5 TABLET ORAL at 22:15

## 2022-12-13 RX ADMIN — OXYCODONE HYDROCHLORIDE 5 MILLIGRAM(S): 5 TABLET ORAL at 17:24

## 2022-12-13 RX ADMIN — OXYCODONE HYDROCHLORIDE 10 MILLIGRAM(S): 5 TABLET ORAL at 05:37

## 2022-12-13 RX ADMIN — OXYCODONE HYDROCHLORIDE 10 MILLIGRAM(S): 5 TABLET ORAL at 06:07

## 2022-12-13 RX ADMIN — PANTOPRAZOLE SODIUM 40 MILLIGRAM(S): 20 TABLET, DELAYED RELEASE ORAL at 06:27

## 2022-12-13 RX ADMIN — ATORVASTATIN CALCIUM 40 MILLIGRAM(S): 80 TABLET, FILM COATED ORAL at 22:15

## 2022-12-13 RX ADMIN — Medication 1 TABLET(S): at 21:47

## 2022-12-13 RX ADMIN — Medication 1000 MILLIGRAM(S): at 23:52

## 2022-12-13 RX ADMIN — HYDROMORPHONE HYDROCHLORIDE 1 MILLIGRAM(S): 2 INJECTION INTRAMUSCULAR; INTRAVENOUS; SUBCUTANEOUS at 09:41

## 2022-12-13 RX ADMIN — OXYCODONE HYDROCHLORIDE 10 MILLIGRAM(S): 5 TABLET ORAL at 22:45

## 2022-12-13 RX ADMIN — HYDROMORPHONE HYDROCHLORIDE 0.5 MILLIGRAM(S): 2 INJECTION INTRAMUSCULAR; INTRAVENOUS; SUBCUTANEOUS at 06:57

## 2022-12-13 RX ADMIN — Medication 975 MILLIGRAM(S): at 02:51

## 2022-12-13 RX ADMIN — Medication 1000 MILLIGRAM(S): at 17:24

## 2022-12-13 RX ADMIN — OXYCODONE HYDROCHLORIDE 10 MILLIGRAM(S): 5 TABLET ORAL at 14:20

## 2022-12-13 NOTE — DISCHARGE NOTE PROVIDER - NSDCMRMEDTOKEN_GEN_ALL_CORE_FT
omeprazole 20 mg oral delayed release tablet: 1 tab(s) orally once a day  Percocet 5 mg-325 mg oral tablet: 1 tab(s) orally every 6 hours MDD:4 tabs  simvastatin 40 mg oral tablet: 1 tab(s) orally once a day (at bedtime)   acetaminophen 500 mg oral tablet: 2 tab(s) orally every 6 hours  omeprazole 20 mg oral delayed release tablet: 1 tab(s) orally once a day  oxyCODONE 5 mg oral tablet: 1 tab(s) orally every 6 hours, As Needed -SEVERE pain MDD:4  simvastatin 40 mg oral tablet: 1 tab(s) orally once a day (at bedtime)

## 2022-12-13 NOTE — DISCHARGE NOTE PROVIDER - CARE PROVIDER_API CALL
Eric López)  Surgery  [address], [address]  [place] [address]  Phone: [phone]  Fax: [phone]  Follow Up Time: 2 weeks    Jens Azul)  Gastroenterology; Internal Medicine  [address] [place] [address], [facility] [address]  [place] [address]  Phone: [phone]  Fax: [phone]  Follow Up Time:

## 2022-12-13 NOTE — DISCHARGE NOTE PROVIDER - CARE PROVIDERS DIRECT ADDRESSES
,DirectAddress_Unknown,[email or web address]

## 2022-12-13 NOTE — DISCHARGE NOTE PROVIDER - NSDCFUADDINST_GEN_ALL_CORE_FT
WOUND CARE:  Please keep incisions clean and dry. Please do not Scrub or rub incisions. Do not use lotion or powder on incisions.   BATHING: You may shower and/or sponge bathe. You may use warm soapy water in the shower and rinse, pat dry.  ACTIVITY: No heavy lifting or straining. Otherwise, you may return to your usual level of physical activity. If you are taking narcotic pain medication DO NOT drive a car, operate machinery or make important decisions.  DIET: Return to your usual diet.  NOTIFY YOUR SURGEON IF YOU HAVE: any bleeding that does not stop, any pus draining from your wound(s), any fever (over 100.4 F) persistent nausea/vomiting, or if your pain is not controlled on your discharge pain medications, unable to urinate.  Please follow up with your primary care physician in one week regarding your hospitalization, bring copies of your discharge paperwork.  Please follow up with your surgeon, Dr. López

## 2022-12-13 NOTE — CHART NOTE - NSCHARTNOTEFT_GEN_A_CORE
Post Operative Note  Patient: REYES, JEFFERSON 47y (1975) Male   MRN: [id number]  Location: [address]  Visit: 12-12-22 Inpatient  Date: 12-13-22 @ 15:42    Procedure: S/P ERCP with CBD stent placement    Subjective: Patient seen and examined post operatively. Reports a burning pain in his epigastrum. Denies nausea, vomiting, fever, chills, chest pain, SOB, cough.      Objective:  Vitals: T(F): 97.3 (12-13-22 @ 11:24), Max: 98 (12-12-22 @ 19:00)  HR: 67 (12-13-22 @ 13:00)  BP: 126/77 (12-13-22 @ 13:00) (93/53 - 139/75)  RR: 17 (12-13-22 @ 13:00)  SpO2: 95% (12-13-22 @ 13:00)      In:   12-12-22 @ 07:01  -  12-13-22 @ 07:00  --------------------------------------------------------  IN: 1875 mL    12-13-22 @ 07:01  -  12-13-22 @ 15:42  --------------------------------------------------------  IN: 2000 mL      IV Fluids: lactated ringers. 1000 milliLiter(s) (75 mL/Hr) IV Continuous <Continuous>      Out:   12-12-22 @ 07:01  -  12-13-22 @ 07:00  --------------------------------------------------------  OUT: 2425 mL    12-13-22 @ 07:01  -  12-13-22 @ 15:42  --------------------------------------------------------  OUT: 650 mL      Voided Urine:   12-12-22 @ 07:01  -  12-13-22 @ 07:00  --------------------------------------------------------  OUT: 2425 mL    12-13-22 @ 07:01  -  12-13-22 @ 15:42  --------------------------------------------------------  OUT: 650 mL      Posada Catheter:  no       Physical Examination:  General: NAD, resting comfortably in bed  HEENT: Normocephalic atraumatic  Respiratory: Nonlabored respirations, normal CW expansion.  Abdomen: softly distended, appropriately tender, surgical incisions are c/d/i.   Vascular: extremities are warm and well perfused.     Imaging:  No post-op imaging studies    Assessment:  47yMale patient S/P ERCP for gallstone at the ampulla    Plan:  - Pain control PRN  - Diet: clear liquids  - IVF  - Activity: OOB as tolerated  - DVT ppx: SCD's & lovenox    Date/Time: 12-13-22 @ 15:42

## 2022-12-13 NOTE — DISCHARGE NOTE PROVIDER - NSDCCPTREATMENT_GEN_ALL_CORE_FT
PRINCIPAL PROCEDURE  Procedure: Laparoscopic cholecystectomy  Findings and Treatment:       SECONDARY PROCEDURE  Procedure: Intraoperative cholangiogram  Findings and Treatment:

## 2022-12-13 NOTE — DISCHARGE NOTE PROVIDER - HOSPITAL COURSE
47M w/ hx cholelithiases gastritis, PUD, p/w diffuse abd pain and chest pain. Pain started around 5pm yesterday, preceded by eating a piece of salmon. Pain diffuse and radiated to mid back, described as burning. Endorses some nausea, no emesis or diarrhea. His last 2 BMs have been dark grey to black in color. Patient has been to the ED 5 times over the last year for similar pain. Each workup demonstrated gallstones but no associated cholecystitis, HIDA last Feb was negative. Patient has been worked up by GI and found to have gastritis, on omeprazole. Patient was planning to see a surgeon in 2 days.     Patient was admitted to surgery under the care of Dr López    Patient went to the OR for laparoscopic cholecystectomy and intraoperative cholangiogram. Patient tolerated operation well and there were no post-operative complications identified. Patient remained hemodynamically stable in the PACU and transferred to the surgical floor. GI was consulted for ERCP 2/2 ampullary stone. ERCP demonstrated:  	    - Z-line regular, 40 cm from the incisors.                       - LA Grade A esophagitis.                       - Normal stomach.                       - Erythematous duodenopathy.                       ERCP:                       - Biliary sphincterotomy performed. The biliary tree was                        swept and sludge was found.                       - Two hemostatic clips were successfully placed (MR                        conditional) in the ampulla.                       - One 10 fr x 7 cm plastic biliary stent was placed into                        the common bile duct.    Diet was restarted and advanced as tolerated. Pain control was transitioned from IV to PO pain meds. At this time, patient is currently ambulating, voiding, tolerating a regular diet. Pain well controlled on PO pain meds. Patient has been deemed stable for discharge home with follow up as an outpatient.     Per GI, patient to follow up with Dr Azul for ERCP stent removal 8-12 weeks

## 2022-12-14 LAB
ALBUMIN SERPL ELPH-MCNC: 4.4 G/DL — SIGNIFICANT CHANGE UP (ref 3.3–5)
ALP SERPL-CCNC: 142 U/L — HIGH (ref 40–120)
ALT FLD-CCNC: 116 U/L — HIGH (ref 4–41)
ANION GAP SERPL CALC-SCNC: 12 MMOL/L — SIGNIFICANT CHANGE UP (ref 7–14)
AST SERPL-CCNC: 27 U/L — SIGNIFICANT CHANGE UP (ref 4–40)
BILIRUB SERPL-MCNC: 0.5 MG/DL — SIGNIFICANT CHANGE UP (ref 0.2–1.2)
BUN SERPL-MCNC: 9 MG/DL — SIGNIFICANT CHANGE UP (ref 7–23)
CALCIUM SERPL-MCNC: 9.8 MG/DL — SIGNIFICANT CHANGE UP (ref 8.4–10.5)
CHLORIDE SERPL-SCNC: 103 MMOL/L — SIGNIFICANT CHANGE UP (ref 98–107)
CO2 SERPL-SCNC: 26 MMOL/L — SIGNIFICANT CHANGE UP (ref 22–31)
CREAT SERPL-MCNC: 0.94 MG/DL — SIGNIFICANT CHANGE UP (ref 0.5–1.3)
EGFR: 102 ML/MIN/1.73M2 — SIGNIFICANT CHANGE UP
GLUCOSE SERPL-MCNC: 91 MG/DL — SIGNIFICANT CHANGE UP (ref 70–99)
HCT VFR BLD CALC: 40.5 % — SIGNIFICANT CHANGE UP (ref 39–50)
HGB BLD-MCNC: 14.4 G/DL — SIGNIFICANT CHANGE UP (ref 13–17)
LIDOCAIN IGE QN: 57 U/L — SIGNIFICANT CHANGE UP (ref 7–60)
MAGNESIUM SERPL-MCNC: 2 MG/DL — SIGNIFICANT CHANGE UP (ref 1.6–2.6)
MCHC RBC-ENTMCNC: 32.8 PG — SIGNIFICANT CHANGE UP (ref 27–34)
MCHC RBC-ENTMCNC: 35.6 GM/DL — SIGNIFICANT CHANGE UP (ref 32–36)
MCV RBC AUTO: 92.3 FL — SIGNIFICANT CHANGE UP (ref 80–100)
NRBC # BLD: 0 /100 WBCS — SIGNIFICANT CHANGE UP (ref 0–0)
NRBC # FLD: 0 K/UL — SIGNIFICANT CHANGE UP (ref 0–0)
PHOSPHATE SERPL-MCNC: 2.8 MG/DL — SIGNIFICANT CHANGE UP (ref 2.5–4.5)
PLATELET # BLD AUTO: 306 K/UL — SIGNIFICANT CHANGE UP (ref 150–400)
POTASSIUM SERPL-MCNC: 5.2 MMOL/L — SIGNIFICANT CHANGE UP (ref 3.5–5.3)
POTASSIUM SERPL-SCNC: 5.2 MMOL/L — SIGNIFICANT CHANGE UP (ref 3.5–5.3)
PROT SERPL-MCNC: 7.1 G/DL — SIGNIFICANT CHANGE UP (ref 6–8.3)
RBC # BLD: 4.39 M/UL — SIGNIFICANT CHANGE UP (ref 4.2–5.8)
RBC # FLD: 11.5 % — SIGNIFICANT CHANGE UP (ref 10.3–14.5)
SODIUM SERPL-SCNC: 141 MMOL/L — SIGNIFICANT CHANGE UP (ref 135–145)
WBC # BLD: 9.49 K/UL — SIGNIFICANT CHANGE UP (ref 3.8–10.5)
WBC # FLD AUTO: 9.49 K/UL — SIGNIFICANT CHANGE UP (ref 3.8–10.5)

## 2022-12-14 PROCEDURE — 99232 SBSQ HOSP IP/OBS MODERATE 35: CPT | Mod: GC

## 2022-12-14 RX ORDER — OXYCODONE HYDROCHLORIDE 5 MG/1
1 TABLET ORAL
Qty: 8 | Refills: 0
Start: 2022-12-14 | End: 2022-12-15

## 2022-12-14 RX ORDER — PANTOPRAZOLE SODIUM 20 MG/1
40 TABLET, DELAYED RELEASE ORAL ONCE
Refills: 0 | Status: COMPLETED | OUTPATIENT
Start: 2022-12-14 | End: 2022-12-14

## 2022-12-14 RX ORDER — ACETAMINOPHEN 500 MG
2 TABLET ORAL
Qty: 0 | Refills: 0 | DISCHARGE
Start: 2022-12-14

## 2022-12-14 RX ORDER — CALCIUM CARBONATE 500(1250)
1 TABLET ORAL ONCE
Refills: 0 | Status: COMPLETED | OUTPATIENT
Start: 2022-12-14 | End: 2022-12-14

## 2022-12-14 RX ADMIN — Medication 1000 MILLIGRAM(S): at 00:00

## 2022-12-14 RX ADMIN — Medication 30 MILLILITER(S): at 18:27

## 2022-12-14 RX ADMIN — ATORVASTATIN CALCIUM 40 MILLIGRAM(S): 80 TABLET, FILM COATED ORAL at 21:31

## 2022-12-14 RX ADMIN — Medication 1000 MILLIGRAM(S): at 05:38

## 2022-12-14 RX ADMIN — Medication 1000 MILLIGRAM(S): at 06:08

## 2022-12-14 RX ADMIN — ENOXAPARIN SODIUM 40 MILLIGRAM(S): 100 INJECTION SUBCUTANEOUS at 05:38

## 2022-12-14 RX ADMIN — OXYCODONE HYDROCHLORIDE 5 MILLIGRAM(S): 5 TABLET ORAL at 09:46

## 2022-12-14 RX ADMIN — OXYCODONE HYDROCHLORIDE 5 MILLIGRAM(S): 5 TABLET ORAL at 09:16

## 2022-12-14 RX ADMIN — PANTOPRAZOLE SODIUM 40 MILLIGRAM(S): 20 TABLET, DELAYED RELEASE ORAL at 05:38

## 2022-12-14 RX ADMIN — Medication 1000 MILLIGRAM(S): at 18:27

## 2022-12-14 RX ADMIN — PANTOPRAZOLE SODIUM 40 MILLIGRAM(S): 20 TABLET, DELAYED RELEASE ORAL at 01:00

## 2022-12-14 RX ADMIN — Medication 1 TABLET(S): at 00:50

## 2022-12-14 RX ADMIN — Medication 1000 MILLIGRAM(S): at 13:25

## 2022-12-14 NOTE — PROGRESS NOTE ADULT - ATTENDING COMMENTS
47M Hx cholelithiasis, PUD p/w diffuse abdominal pain attributed to biliary colic now s/p lap chata (12/12/22) with (+) IOC for suspected choledocholithiasis. Now s/p ERCP (12/13/22) with biliary sphincterotomy s/p 2 hemostatic clips, CBD sludge with CBD stent placement.    Plan: Advance diet, monitor H and H.

## 2022-12-15 ENCOUNTER — TRANSCRIPTION ENCOUNTER (OUTPATIENT)
Age: 47
End: 2022-12-15

## 2022-12-15 VITALS
TEMPERATURE: 98 F | DIASTOLIC BLOOD PRESSURE: 79 MMHG | OXYGEN SATURATION: 100 % | RESPIRATION RATE: 17 BRPM | SYSTOLIC BLOOD PRESSURE: 125 MMHG | HEART RATE: 66 BPM

## 2022-12-15 LAB
ALBUMIN SERPL ELPH-MCNC: 4.2 G/DL — SIGNIFICANT CHANGE UP (ref 3.3–5)
ALP SERPL-CCNC: 117 U/L — SIGNIFICANT CHANGE UP (ref 40–120)
ALT FLD-CCNC: 103 U/L — HIGH (ref 4–41)
ANION GAP SERPL CALC-SCNC: 9 MMOL/L — SIGNIFICANT CHANGE UP (ref 7–14)
AST SERPL-CCNC: 43 U/L — HIGH (ref 4–40)
BILIRUB SERPL-MCNC: 0.4 MG/DL — SIGNIFICANT CHANGE UP (ref 0.2–1.2)
BUN SERPL-MCNC: 10 MG/DL — SIGNIFICANT CHANGE UP (ref 7–23)
CALCIUM SERPL-MCNC: 9.4 MG/DL — SIGNIFICANT CHANGE UP (ref 8.4–10.5)
CHLORIDE SERPL-SCNC: 103 MMOL/L — SIGNIFICANT CHANGE UP (ref 98–107)
CHOLEST SERPL-MCNC: 123 MG/DL — SIGNIFICANT CHANGE UP
CO2 SERPL-SCNC: 26 MMOL/L — SIGNIFICANT CHANGE UP (ref 22–31)
CREAT SERPL-MCNC: 0.92 MG/DL — SIGNIFICANT CHANGE UP (ref 0.5–1.3)
EGFR: 105 ML/MIN/1.73M2 — SIGNIFICANT CHANGE UP
GLUCOSE SERPL-MCNC: 141 MG/DL — HIGH (ref 70–99)
HCT VFR BLD CALC: 41.2 % — SIGNIFICANT CHANGE UP (ref 39–50)
HDLC SERPL-MCNC: 32 MG/DL — LOW
HGB BLD-MCNC: 14.6 G/DL — SIGNIFICANT CHANGE UP (ref 13–17)
LIPID PNL WITH DIRECT LDL SERPL: 50 MG/DL — SIGNIFICANT CHANGE UP
MAGNESIUM SERPL-MCNC: 2.2 MG/DL — SIGNIFICANT CHANGE UP (ref 1.6–2.6)
MCHC RBC-ENTMCNC: 32.5 PG — SIGNIFICANT CHANGE UP (ref 27–34)
MCHC RBC-ENTMCNC: 35.4 GM/DL — SIGNIFICANT CHANGE UP (ref 32–36)
MCV RBC AUTO: 91.8 FL — SIGNIFICANT CHANGE UP (ref 80–100)
NON HDL CHOLESTEROL: 91 MG/DL — SIGNIFICANT CHANGE UP
NRBC # BLD: 0 /100 WBCS — SIGNIFICANT CHANGE UP (ref 0–0)
NRBC # FLD: 0 K/UL — SIGNIFICANT CHANGE UP (ref 0–0)
PHOSPHATE SERPL-MCNC: 3.1 MG/DL — SIGNIFICANT CHANGE UP (ref 2.5–4.5)
PLATELET # BLD AUTO: 303 K/UL — SIGNIFICANT CHANGE UP (ref 150–400)
POTASSIUM SERPL-MCNC: 4 MMOL/L — SIGNIFICANT CHANGE UP (ref 3.5–5.3)
POTASSIUM SERPL-SCNC: 4 MMOL/L — SIGNIFICANT CHANGE UP (ref 3.5–5.3)
PROT SERPL-MCNC: 6.6 G/DL — SIGNIFICANT CHANGE UP (ref 6–8.3)
RBC # BLD: 4.49 M/UL — SIGNIFICANT CHANGE UP (ref 4.2–5.8)
RBC # FLD: 11.9 % — SIGNIFICANT CHANGE UP (ref 10.3–14.5)
SODIUM SERPL-SCNC: 138 MMOL/L — SIGNIFICANT CHANGE UP (ref 135–145)
TRIGL SERPL-MCNC: 206 MG/DL — HIGH
WBC # BLD: 6.96 K/UL — SIGNIFICANT CHANGE UP (ref 3.8–10.5)
WBC # FLD AUTO: 6.96 K/UL — SIGNIFICANT CHANGE UP (ref 3.8–10.5)

## 2022-12-15 RX ADMIN — ENOXAPARIN SODIUM 40 MILLIGRAM(S): 100 INJECTION SUBCUTANEOUS at 05:31

## 2022-12-15 RX ADMIN — Medication 1000 MILLIGRAM(S): at 01:10

## 2022-12-15 RX ADMIN — PANTOPRAZOLE SODIUM 40 MILLIGRAM(S): 20 TABLET, DELAYED RELEASE ORAL at 07:01

## 2022-12-15 RX ADMIN — Medication 30 MILLILITER(S): at 00:39

## 2022-12-15 RX ADMIN — Medication 1000 MILLIGRAM(S): at 05:53

## 2022-12-15 RX ADMIN — Medication 1000 MILLIGRAM(S): at 05:34

## 2022-12-15 RX ADMIN — Medication 1000 MILLIGRAM(S): at 00:39

## 2022-12-15 RX ADMIN — Medication 1000 MILLIGRAM(S): at 11:32

## 2022-12-15 RX ADMIN — Medication 1000 MILLIGRAM(S): at 12:00

## 2022-12-15 NOTE — PROGRESS NOTE ADULT - SUBJECTIVE AND OBJECTIVE BOX
Surgery Progress Note    INTERVAL EVENTS:   No acute events overnight.    SUBJECTIVE: Patient seen and examined at bedside with surgical team, patient tolerating regular diet, no nausea or vomiting. Complains of epigastric pain and burning sensation.     OBJECTIVE:    Vital Signs Last 24 Hrs  T(C): 36.6 (14 Dec 2022 06:00), Max: 36.8 (13 Dec 2022 14:00)  T(F): 97.8 (14 Dec 2022 06:00), Max: 98.2 (13 Dec 2022 14:00)  HR: 62 (14 Dec 2022 06:00) (50 - 93)  BP: 120/68 (14 Dec 2022 06:00) (106/62 - 133/80)  BP(mean): --  RR: 18 (14 Dec 2022 06:00) (9 - 19)  SpO2: 100% (14 Dec 2022 06:00) (95% - 100%)    Parameters below as of 14 Dec 2022 06:00  Patient On (Oxygen Delivery Method): room air    I&O's Detail    13 Dec 2022 07:01  -  14 Dec 2022 07:00  --------------------------------------------------------  IN:    Lactated Ringers: 3050 mL    Oral Fluid: 1060 mL  Total IN: 4110 mL    OUT:    Blood Loss (mL): 0 mL    IV PiggyBack: 0 mL    Voided (mL): 2600 mL  Total OUT: 2600 mL    Total NET: 1510 mL      MEDICATIONS  (STANDING):  acetaminophen     Tablet .. 1000 milliGRAM(s) Oral every 6 hours  atorvastatin 40 milliGRAM(s) Oral at bedtime  ciprofloxacin   IVPB 400 milliGRAM(s) IV Intermittent once  enoxaparin Injectable 40 milliGRAM(s) SubCutaneous every 24 hours  indomethacin Suppository 100 milliGRAM(s) Rectal once  pantoprazole    Tablet 40 milliGRAM(s) Oral before breakfast    MEDICATIONS  (PRN):  oxyCODONE    IR 5 milliGRAM(s) Oral every 4 hours PRN Moderate Pain (4 - 6)  oxyCODONE    IR 10 milliGRAM(s) Oral every 4 hours PRN Severe Pain (7 - 10)      PHYSICAL EXAM:  Constitutional: NAD  Respiratory: Unlabored breathing  Abdomen: Soft, nondistended, NTTP. No rebound or guarding.  Extremities: WWP, STEVENSON spontaneously    LABS:                        14.4   9.49  )-----------( 306      ( 14 Dec 2022 06:15 )             40.5     12-14    141  |  103  |  9   ----------------------------<  91  5.2   |  26  |  0.94    Ca    9.8      14 Dec 2022 06:15  Phos  2.8     12-14  Mg     2.00     12-14    TPro  7.1  /  Alb  4.4  /  TBili  0.5  /  DBili  x   /  AST  27  /  ALT  116<H>  /  AlkPhos  142<H>  12-14      LIVER FUNCTIONS - ( 14 Dec 2022 06:15 )  Alb: 4.4 g/dL / Pro: 7.1 g/dL / ALK PHOS: 142 U/L / ALT: 116 U/L / AST: 27 U/L / GGT: x                 IMAGING:    
  Interval Events:   NAEON, afebrile HD stable   S/p ERCP (12/13/22) with biliary sphincterotomy s/p 2 hemostatic clips, CBD sludge with CBD stent placement  Reporting significant abdominal pain at incision sites, endorsing right sided pain now improved after ERCP +  endorsing intermittent heartburn/reflux  Denies n/v   Denies melena, hematochezia  Liver enzymes downtrending  Hb stable at 14.4        Allergies:  No Known Allergies        Hospital Medications:  acetaminophen     Tablet .. 1000 milliGRAM(s) Oral every 6 hours  atorvastatin 40 milliGRAM(s) Oral at bedtime  ciprofloxacin   IVPB 400 milliGRAM(s) IV Intermittent once  enoxaparin Injectable 40 milliGRAM(s) SubCutaneous every 24 hours  indomethacin Suppository 100 milliGRAM(s) Rectal once  oxyCODONE    IR 5 milliGRAM(s) Oral every 4 hours PRN  oxyCODONE    IR 10 milliGRAM(s) Oral every 4 hours PRN  pantoprazole    Tablet 40 milliGRAM(s) Oral before breakfast      PMHX/PSHX:  HLD (hyperlipidemia)    Gallstones    Peptic ulcer disease    No significant past surgical history        Family history:  Family history of diabetes mellitus (DM)    FH: HTN (hypertension)    FH: hyperlipidemia        ROS:     General:  No wt loss, fevers, chills, night sweats, fatigue,   Eyes:  Good vision, no reported pain  ENT:  No sore throat, pain, runny nose, dysphagia  CV:  No pain, palpitations, hypo/hypertension  Pulm:  No dyspnea, cough, tachypnea, wheezing  GI: see above  :  No pain, bleeding, incontinence, nocturia  Muscle:  No pain, weakness  Neuro:  No weakness, tingling, memory problems  Psych:  No fatigue, insomnia, mood problems, depression  Endocrine:  No polyuria, polydipsia, cold/heat intolerance  Heme:  No petechiae, ecchymosis, easy bruisability  Skin:  No rash, tattoos, scars, edema      PHYSICAL EXAM:   Vital Signs:  Vital Signs Last 24 Hrs  T(C): 36.6 (14 Dec 2022 06:00), Max: 36.8 (13 Dec 2022 14:00)  T(F): 97.8 (14 Dec 2022 06:00), Max: 98.2 (13 Dec 2022 14:00)  HR: 62 (14 Dec 2022 06:00) (50 - 93)  BP: 120/68 (14 Dec 2022 06:00) (115/64 - 133/80)  BP(mean): --  RR: 18 (14 Dec 2022 06:00) (9 - 19)  SpO2: 100% (14 Dec 2022 06:00) (95% - 100%)    Parameters below as of 14 Dec 2022 06:00  Patient On (Oxygen Delivery Method): room air      Daily     Daily     GENERAL:  No acute distress  HEENT:  Normocephalic/atraumatic,  no scleral icterus  CHEST:  no accessory muscle use  HEART:  Regular rate and rhythm, no murmurs/rubs/gallops  ABDOMEN:  Soft, +TTP at incision sites, non-distended, surgical dressing c/d/i  EXTREMITIES:  No cyanosis, clubbing, or edema  SKIN:  No rash/e/warm/dry  NEURO:  Alert and oriented x 3      LABS:                        14.4   9.49  )-----------( 306      ( 14 Dec 2022 06:15 )             40.5     Mean Cell Volume: 92.3 fL (12-14-22 @ 06:15)    12-14    141  |  103  |  9   ----------------------------<  91  5.2   |  26  |  0.94    Ca    9.8      14 Dec 2022 06:15  Phos  2.8     12-14  Mg     2.00     12-14    TPro  7.1  /  Alb  4.4  /  TBili  0.5  /  DBili  x   /  AST  27  /  ALT  116<H>  /  AlkPhos  142<H>  12-14    LIVER FUNCTIONS - ( 14 Dec 2022 06:15 )  Alb: 4.4 g/dL / Pro: 7.1 g/dL / ALK PHOS: 142 U/L / ALT: 116 U/L / AST: 27 U/L / GGT: x                                       14.4   9.49  )-----------( 306      ( 14 Dec 2022 06:15 )             40.5                         13.5   10.98 )-----------( 272      ( 13 Dec 2022 05:31 )             38.6                         15.4   10.36 )-----------( 318      ( 11 Dec 2022 21:27 )             43.2       Imaging:    < from: ERCP (12.13.22 @ 09:53) >  Findings:       A  film of the abdomen was obtained. Surgical clips, consistent        with a previous cholecystectomy, were seen in the area of the right        upper quadrant of the abdomen. A standard esophagogastroduodenoscopy        scope was used for the examination of the upper gastrointestinal tract.        The scope was passed under direct vision through the upper GI tract. The        Z-line was regular and was found 40 cm from the incisors. LA Grade A        (one or more mucosal breaks less than 5 mm, not extending between tops        of 2 mucosal folds) esophagitis was found. The entire examined stomach        was normal. Patchy erythematous mucosa was found in the duodenal bulb.        The endoscope was withdrawn from the patient. The esophagus was        successfully intubated under direct vision. The scope was advanced to a        normal major papilla in thedescending duodenum without detailed        examination of the pharynx, larynx and associated structures, and upper        GI tract. The upper GI tract was grossly normal. Deep bile duct        cannulation was obtained. A short 0.035 inch Soft Jagwire was passed        into the biliary tree. Full strength ionic contrast was injected into        the biliary tree. I personally interpreted the bile duct images. There        was brisk flow of contrast through the ducts. Image quality was        excellent. Contrast extended to the entire biliary tree. The common bile        duct contained filling defect(s) thought to be sludge. There was no        extravasation of contrast. Biliary sphincterotomy was made with a        traction (standard) sphincterotome using ERBE electrocautery. Minor        bleeding from the sphincterotomy was successfully treated. To discover        objects, the biliary tree was swept with a 12 mm balloon starting at the        bifurcation. Sludge was swept from the duct. To prevent bleeding        post-intervention in the ampulla, two hemostatic clips were successfully        placed (MR conditional). There was no bleeding at the end of the        procedure. One 10 Fr by 7 cm plastic biliary stent was placed into the       common bile duct. Bile flowed through the stent. The stent was in good        position. The total fluoroscopy exposure time was 1.5 minutes.        Indomethacin 100 mg was given via suppository to decrease the risk of        post-ERCP pancreatitis (PEP). The endoscope was withdrawn from the        patient.                                                                                   Impression:          EGD:                       - Z-line regular, 40 cm from the incisors.       - LA Grade A esophagitis.                       - Normal stomach.                       - Erythematous duodenopathy.                       ERCP:                       - Biliary sphincterotomy performed. The biliary tree was    swept and sludge was found.                       - Two hemostatic clips were successfully placed (MR                        conditional) in the ampulla.                       - One 10 fr x 7 cm plastic biliary stent was placed into         the common bile duct.  Recommendation:      - Clear liquid diet.                       - Use a proton pump inhibitor PO for mild esophagitis.                       - Return for stent removal ERCP in approximately 8 - 12                        weeks.                       - The findings and recommendations were discussed with                        the patient. Discharge patient to home. Patient has a                        contact number available for emergencies. The signs and             symptoms of potential delayed complications were                        discussed with the patient. Return to normal activities                        tomorrow. Written discharge instructions were provided                        to the patient.    < end of copied text >        
Surgery Progress Note    INTERVAL EVENTS:   No acute events overnight.    SUBJECTIVE: Patient seen and examined at bedside with surgical team, Patient denies nausea or vomiting. Pain better controlled.     OBJECTIVE:    Vital Signs Last 24 Hrs  T(C): 36.6 (15 Dec 2022 05:28), Max: 37.2 (14 Dec 2022 17:30)  T(F): 97.8 (15 Dec 2022 05:28), Max: 99 (14 Dec 2022 17:30)  HR: 57 (15 Dec 2022 05:28) (57 - 67)  BP: 101/65 (15 Dec 2022 05:28) (101/65 - 127/74)  BP(mean): --  RR: 18 (15 Dec 2022 05:28) (17 - 18)  SpO2: 98% (15 Dec 2022 05:28) (97% - 99%)    Parameters below as of 15 Dec 2022 05:28  Patient On (Oxygen Delivery Method): room air    I&O's Detail    14 Dec 2022 07:01  -  15 Dec 2022 07:00  --------------------------------------------------------  IN:    Oral Fluid: 1470 mL  Total IN: 1470 mL    OUT:    Blood Loss (mL): 0 mL    IV PiggyBack: 0 mL    Voided (mL): 1650 mL  Total OUT: 1650 mL    Total NET: -180 mL      MEDICATIONS  (STANDING):  acetaminophen     Tablet .. 1000 milliGRAM(s) Oral every 6 hours  atorvastatin 40 milliGRAM(s) Oral at bedtime  ciprofloxacin   IVPB 400 milliGRAM(s) IV Intermittent once  enoxaparin Injectable 40 milliGRAM(s) SubCutaneous every 24 hours  indomethacin Suppository 100 milliGRAM(s) Rectal once  pantoprazole    Tablet 40 milliGRAM(s) Oral before breakfast    MEDICATIONS  (PRN):  aluminum hydroxide/magnesium hydroxide/simethicone Suspension 30 milliLiter(s) Oral every 6 hours PRN Heartburn  oxyCODONE    IR 5 milliGRAM(s) Oral every 4 hours PRN Moderate Pain (4 - 6)  oxyCODONE    IR 10 milliGRAM(s) Oral every 4 hours PRN Severe Pain (7 - 10)      PHYSICAL EXAM:  Constitutional: NAD  Respiratory: Unlabored breathing  Abdomen: Soft, nondistended, tender to palpation on epigastrium. No rebound or guarding.  Extremities: WWP, STEVENSON spontaneously    LABS:                        14.6   6.96  )-----------( 303      ( 15 Dec 2022 06:22 )             41.2     12-14    141  |  103  |  9   ----------------------------<  91  5.2   |  26  |  0.94    Ca    9.8      14 Dec 2022 06:15  Phos  2.8     12-14  Mg     2.00     12-14    TPro  7.1  /  Alb  4.4  /  TBili  0.5  /  DBili  x   /  AST  27  /  ALT  116<H>  /  AlkPhos  142<H>  12-14      LIVER FUNCTIONS - ( 14 Dec 2022 06:15 )  Alb: 4.4 g/dL / Pro: 7.1 g/dL / ALK PHOS: 142 U/L / ALT: 116 U/L / AST: 27 U/L / GGT: x                 IMAGING:    
TEAM [ B ] Surgery Daily Progress Note  =====================================================    SUBJECTIVE: Patient seen and examined at bedside on AM rounds. Patient is experiencing abdominal pain, PO pain medications converted to IV, pending ERCP today with GI. Denies fever, chills, N/V, chest pain, SOB      ALLERGIES:  No Known Allergies      --------------------------------------------------------------------------------------    MEDICATIONS:    Neurologic Medications  acetaminophen   IVPB .. 1000 milliGRAM(s) IV Intermittent every 6 hours  HYDROmorphone  Injectable 0.5 milliGRAM(s) IV Push every 4 hours PRN Moderate Pain (4 - 6)  HYDROmorphone  Injectable 1 milliGRAM(s) IV Push every 4 hours PRN Severe Pain (7 - 10)    Respiratory Medications    Cardiovascular Medications    Gastrointestinal Medications  lactated ringers. 1000 milliLiter(s) IV Continuous <Continuous>  magnesium sulfate  IVPB 2 Gram(s) IV Intermittent once  pantoprazole  Injectable 40 milliGRAM(s) IV Push daily    Genitourinary Medications    Hematologic/Oncologic Medications    Antimicrobial/Immunologic Medications    Endocrine/Metabolic Medications  atorvastatin 40 milliGRAM(s) Oral at bedtime    Topical/Other Medications    --------------------------------------------------------------------------------------    VITAL SIGNS:  ICU Vital Signs Last 24 Hrs  T(C): 36.7 (13 Dec 2022 05:35), Max: 36.7 (12 Dec 2022 13:00)  T(F): 98 (13 Dec 2022 05:35), Max: 98.1 (12 Dec 2022 13:00)  HR: 64 (13 Dec 2022 05:35) (54 - 84)  BP: 109/61 (13 Dec 2022 06:55) (93/53 - 141/75)  BP(mean): 71 (12 Dec 2022 20:00) (71 - 92)  ABP: --  ABP(mean): --  RR: 17 (13 Dec 2022 05:35) (13 - 21)  SpO2: 99% (13 Dec 2022 05:35) (98% - 100%)    O2 Parameters below as of 13 Dec 2022 05:35  Patient On (Oxygen Delivery Method): room air    --------------------------------------------------------------------------------------    EXAM    General: NAD, resting in bed comfortably.  Cardiac: regular rate, warm and well perfused  Respiratory: Nonlabored respirations, normal cw expansion.  Abdomen: soft, diffuse TTP, nondistended, port sites c/d/i  Extremities: normal strength, FROM, no deformities    --------------------------------------------------------------------------------------    LABS                        13.5   10.98 )-----------( 272      ( 13 Dec 2022 05:31 )             38.6   12-13    134<L>  |  101  |  8   ----------------------------<  63<L>  3.8   |  17<L>  |  0.89    Ca    8.8      13 Dec 2022 05:31  Phos  3.5     12-13  Mg     1.70     12-13    TPro  6.2  /  Alb  4.0  /  TBili  0.5  /  DBili  x   /  AST  53<H>  /  ALT  152<H>  /  AlkPhos  145<H>  12-13      --------------------------------------------------------------------------------------    INS AND OUTS:  I&O's Detail    12 Dec 2022 07:01  -  13 Dec 2022 07:00  --------------------------------------------------------  IN:    Lactated Ringers: 750 mL    Lactated Ringers: 1125 mL  Total IN: 1875 mL    OUT:    IV PiggyBack: 0 mL    Oral Fluid: 0 mL    Voided (mL): 2425 mL  Total OUT: 2425 mL    Total NET: -550 mL  --------------------------------------------------------------------------------------

## 2022-12-15 NOTE — DISCHARGE NOTE NURSING/CASE MANAGEMENT/SOCIAL WORK - PATIENT PORTAL LINK FT
You can access the FollowMyHealth Patient Portal offered by [facility] by registering at the following website: [email or web address]. By joining [facility]’s FollowMyHealth portal, you will also be able to view your health information using other applications (apps) compatible with our system.

## 2022-12-15 NOTE — DISCHARGE NOTE NURSING/CASE MANAGEMENT/SOCIAL WORK - NSDCPEFALRISK_GEN_ALL_CORE
For information on Fall & Injury Prevention, visit: [email or web address] OR  [email or web address] OR  https://www.cdc.gov/steadi/patient.html

## 2022-12-15 NOTE — PROGRESS NOTE ADULT - ASSESSMENT
47 year old male with PMH gastritis and biliary colic now s/p laparoscopic cholecystectomy and IOC 12/12 2/2 choledocholithiasis    Plan  - ERCP with GI today  - Diet: NPO  - Pain control  -  cc/hr  
47 year old male with PMH gastritis and biliary colic now s/p laparoscopic cholecystectomy and IOC 12/12 2/2 choledocholithiasis S/p ERCP on 12/13    Plan  - Reg diet   - Pain control  - Monitor LFT  - Lipid panel   - Discharge home today      B team [id number]
47M Hx cholelithiasis, PUD p/w diffuse abdominal pain attributed to biliary colic now s/p lap chata (12/12/22) with (+) IOC for suspected choledocholithiasis. Now s/p ERCP (12/13/22) with biliary sphincterotomy s/p 2 hemostatic clips, CBD sludge with CBD stent placement      Impression:   #CBD sludge s/p ERCP (12/13/22) biliary sphincterotomy + CBD stent placement: underwent ERCP (12/13/22) with (+) IOC after lap chata, suspected choledocholithiasis. During ERCP found to have CBD sludge (no choledocholithiasis/bile leak seen)--> underwent biliary sphincterotomy s/p 2 clips + CBD stent placement. Liver enzymes improving.    #Cholelithiasis s/p lap chata (12/12/22)  #Abdominal pain: on-going abdominal pain at incision sites after lap chata. No evidence of bile leak or choledocholithiasis seen on ERCP (12/12/22)    #Heartburn/reflux  #LA grade A Esophagitis: seen on EGD       Recommendations:   - Advance diet as tolerated per surgery team  - Consider IV pepcid BID for heartburn/reflux  - Trend liver enzymes  - Return for stent removal ERCP in approximately 8 - 12 weeks.      Thank you for involving us in the care of this patient, please reach out if any further questions.     Kb Prakash MD  Gastroenterology/Hepatology Fellow, PGY6    Available on [employer] Teams  [phone] ([facility])  [id number] ([facility])  Please contact on call fellow weekdays after 5pm-7am and weekends: [phone]        
47 year old male with PMH gastritis and biliary colic now s/p laparoscopic cholecystectomy and IOC 12/12 2/2 choledocholithiasis S/p ERCP on 12/13    Plan  - Reg diet   - Pain control  - Monitor LFT  - Discharge home in the afternoon if pain improved.       B team [id number]

## 2022-12-19 ENCOUNTER — NON-APPOINTMENT (OUTPATIENT)
Age: 47
End: 2022-12-19

## 2022-12-19 DIAGNOSIS — K80.50 CALCULUS OF BILE DUCT W/OUT CHOLANGITIS OR CHOLECYSTITIS W/OUT OBSTRUCTION: ICD-10-CM

## 2022-12-23 LAB — SURGICAL PATHOLOGY STUDY: SIGNIFICANT CHANGE UP

## 2023-01-20 ENCOUNTER — NON-APPOINTMENT (OUTPATIENT)
Age: 48
End: 2023-01-20

## 2023-03-31 ENCOUNTER — OUTPATIENT (OUTPATIENT)
Dept: OUTPATIENT SERVICES | Facility: HOSPITAL | Age: 48
LOS: 1 days | End: 2023-03-31

## 2023-03-31 VITALS
HEART RATE: 76 BPM | OXYGEN SATURATION: 98 % | WEIGHT: 156.09 LBS | RESPIRATION RATE: 16 BRPM | SYSTOLIC BLOOD PRESSURE: 110 MMHG | HEIGHT: 63 IN | TEMPERATURE: 97 F | DIASTOLIC BLOOD PRESSURE: 80 MMHG

## 2023-03-31 DIAGNOSIS — K80.50 CALCULUS OF BILE DUCT WITHOUT CHOLANGITIS OR CHOLECYSTITIS WITHOUT OBSTRUCTION: ICD-10-CM

## 2023-03-31 DIAGNOSIS — Z90.49 ACQUIRED ABSENCE OF OTHER SPECIFIED PARTS OF DIGESTIVE TRACT: Chronic | ICD-10-CM

## 2023-03-31 DIAGNOSIS — Z98.890 OTHER SPECIFIED POSTPROCEDURAL STATES: Chronic | ICD-10-CM

## 2023-03-31 LAB
ALBUMIN SERPL ELPH-MCNC: 4.7 G/DL — SIGNIFICANT CHANGE UP (ref 3.3–5)
ALP SERPL-CCNC: 79 U/L — SIGNIFICANT CHANGE UP (ref 40–120)
ALT FLD-CCNC: <5 U/L — LOW (ref 4–41)
ANION GAP SERPL CALC-SCNC: 12 MMOL/L — SIGNIFICANT CHANGE UP (ref 7–14)
AST SERPL-CCNC: 30 U/L — SIGNIFICANT CHANGE UP (ref 4–40)
BILIRUB SERPL-MCNC: 0.2 MG/DL — SIGNIFICANT CHANGE UP (ref 0.2–1.2)
BUN SERPL-MCNC: 16 MG/DL — SIGNIFICANT CHANGE UP (ref 7–23)
CALCIUM SERPL-MCNC: 9.4 MG/DL — SIGNIFICANT CHANGE UP (ref 8.4–10.5)
CHLORIDE SERPL-SCNC: 102 MMOL/L — SIGNIFICANT CHANGE UP (ref 98–107)
CO2 SERPL-SCNC: 25 MMOL/L — SIGNIFICANT CHANGE UP (ref 22–31)
CREAT SERPL-MCNC: 0.83 MG/DL — SIGNIFICANT CHANGE UP (ref 0.5–1.3)
EGFR: 110 ML/MIN/1.73M2 — SIGNIFICANT CHANGE UP
GLUCOSE SERPL-MCNC: 141 MG/DL — HIGH (ref 70–99)
HCT VFR BLD CALC: 45.4 % — SIGNIFICANT CHANGE UP (ref 39–50)
HGB BLD-MCNC: 16.1 G/DL — SIGNIFICANT CHANGE UP (ref 13–17)
MCHC RBC-ENTMCNC: 32.2 PG — SIGNIFICANT CHANGE UP (ref 27–34)
MCHC RBC-ENTMCNC: 35.5 GM/DL — SIGNIFICANT CHANGE UP (ref 32–36)
MCV RBC AUTO: 90.8 FL — SIGNIFICANT CHANGE UP (ref 80–100)
NRBC # BLD: 0 /100 WBCS — SIGNIFICANT CHANGE UP (ref 0–0)
NRBC # FLD: 0 K/UL — SIGNIFICANT CHANGE UP (ref 0–0)
PLATELET # BLD AUTO: 350 K/UL — SIGNIFICANT CHANGE UP (ref 150–400)
POTASSIUM SERPL-MCNC: 4.5 MMOL/L — SIGNIFICANT CHANGE UP (ref 3.5–5.3)
POTASSIUM SERPL-SCNC: 4.5 MMOL/L — SIGNIFICANT CHANGE UP (ref 3.5–5.3)
PROT SERPL-MCNC: 7.2 G/DL — SIGNIFICANT CHANGE UP (ref 6–8.3)
RBC # BLD: 5 M/UL — SIGNIFICANT CHANGE UP (ref 4.2–5.8)
RBC # FLD: 11.9 % — SIGNIFICANT CHANGE UP (ref 10.3–14.5)
SODIUM SERPL-SCNC: 139 MMOL/L — SIGNIFICANT CHANGE UP (ref 135–145)
WBC # BLD: 8.19 K/UL — SIGNIFICANT CHANGE UP (ref 3.8–10.5)
WBC # FLD AUTO: 8.19 K/UL — SIGNIFICANT CHANGE UP (ref 3.8–10.5)

## 2023-03-31 RX ORDER — SIMVASTATIN 20 MG/1
1 TABLET, FILM COATED ORAL
Qty: 0 | Refills: 0 | DISCHARGE

## 2023-03-31 RX ORDER — SODIUM CHLORIDE 9 MG/ML
1000 INJECTION, SOLUTION INTRAVENOUS
Refills: 0 | Status: DISCONTINUED | OUTPATIENT
Start: 2023-04-13 | End: 2023-04-27

## 2023-03-31 RX ORDER — OMEPRAZOLE 10 MG/1
1 CAPSULE, DELAYED RELEASE ORAL
Qty: 0 | Refills: 0 | DISCHARGE

## 2023-03-31 NOTE — H&P PST ADULT - NSICDXPASTMEDICALHX_GEN_ALL_CORE_FT
PAST MEDICAL HISTORY:  Gallstones 1/2022 treated with amoxicillin 10 days at [place]    GERD (gastroesophageal reflux disease)     HLD (hyperlipidemia)     Peptic ulcer disease

## 2023-03-31 NOTE — H&P PST ADULT - CARDIOVASCULAR
Initial (On Arrival) normal/regular rate and rhythm/S1 S2 present/no gallops/no rub/no murmur/no pedal edema/vascular details…

## 2023-03-31 NOTE — H&P PST ADULT - HISTORY OF PRESENT ILLNESS
48 y/o male PMH GERD and HLD presents to presurgical testing with diagnosis of calculus of bile duct without cholangitis/cholecystitis without obstruction. Pt underwent laparoscopic cholecystectomy and intraoperative cholangiogram which revealed a CBD stone. Pt underwent and ERCP with stent placement in 12/2022. Pt is scheduled for ERCP with stent removal.

## 2023-03-31 NOTE — H&P PST ADULT - PROBLEM SELECTOR PLAN 1
Patient tentatively scheduled for ERCP stent removal for 4/13/23. Pre-op instructions provided. Pt given verbal and written instructions with teach back on pepcid. Pt verbalized understanding with return demonstration.     CBC CMP done.  48 y/o male low risk patient scheduled for a low risk procedure.  No further evaluations requested.

## 2023-04-12 RX ORDER — SODIUM CHLORIDE 9 MG/ML
500 INJECTION, SOLUTION INTRAVENOUS
Refills: 0 | Status: DISCONTINUED | OUTPATIENT
Start: 2023-04-13 | End: 2023-04-27

## 2023-04-13 ENCOUNTER — APPOINTMENT (OUTPATIENT)
Dept: GASTROENTEROLOGY | Facility: HOSPITAL | Age: 48
End: 2023-04-13

## 2023-04-13 ENCOUNTER — OUTPATIENT (OUTPATIENT)
Dept: OUTPATIENT SERVICES | Facility: HOSPITAL | Age: 48
LOS: 1 days | Discharge: HOME | End: 2023-04-13
Payer: COMMERCIAL

## 2023-04-13 ENCOUNTER — TRANSCRIPTION ENCOUNTER (OUTPATIENT)
Age: 48
End: 2023-04-13

## 2023-04-13 VITALS
HEIGHT: 63 IN | HEART RATE: 66 BPM | DIASTOLIC BLOOD PRESSURE: 61 MMHG | SYSTOLIC BLOOD PRESSURE: 96 MMHG | WEIGHT: 149.91 LBS | TEMPERATURE: 98 F | RESPIRATION RATE: 20 BRPM | OXYGEN SATURATION: 100 %

## 2023-04-13 VITALS
SYSTOLIC BLOOD PRESSURE: 108 MMHG | HEART RATE: 64 BPM | OXYGEN SATURATION: 98 % | RESPIRATION RATE: 16 BRPM | DIASTOLIC BLOOD PRESSURE: 66 MMHG

## 2023-04-13 DIAGNOSIS — Z90.49 ACQUIRED ABSENCE OF OTHER SPECIFIED PARTS OF DIGESTIVE TRACT: Chronic | ICD-10-CM

## 2023-04-13 DIAGNOSIS — Z98.890 OTHER SPECIFIED POSTPROCEDURAL STATES: Chronic | ICD-10-CM

## 2023-04-13 DIAGNOSIS — K80.50 CALCULUS OF BILE DUCT WITHOUT CHOLANGITIS OR CHOLECYSTITIS WITHOUT OBSTRUCTION: ICD-10-CM

## 2023-04-13 PROCEDURE — 43264 ERCP REMOVE DUCT CALCULI: CPT | Mod: GC

## 2023-04-13 PROCEDURE — 43275 ERCP REMOVE FORGN BODY DUCT: CPT | Mod: GC

## 2023-04-13 PROCEDURE — 74330 X-RAY BILE/PANC ENDOSCOPY: CPT | Mod: 26,GC

## 2023-04-13 DEVICE — BLLN EXTRCTR PRO RX-S 9-12MM: Type: IMPLANTABLE DEVICE | Status: FUNCTIONAL

## 2023-04-13 DEVICE — GWIRE JAGTOME REVOLUTION RX 260CM/0.025IN: Type: IMPLANTABLE DEVICE | Status: FUNCTIONAL

## 2023-04-13 RX ORDER — ATORVASTATIN CALCIUM 80 MG/1
1 TABLET, FILM COATED ORAL
Refills: 0 | DISCHARGE

## 2023-04-13 RX ORDER — INDOMETHACIN 50 MG
100 CAPSULE ORAL ONCE
Refills: 0 | Status: COMPLETED | OUTPATIENT
Start: 2023-04-13 | End: 2023-04-13

## 2023-04-13 RX ORDER — SODIUM CHLORIDE 9 MG/ML
1000 INJECTION, SOLUTION INTRAVENOUS ONCE
Refills: 0 | Status: COMPLETED | OUTPATIENT
Start: 2023-04-13 | End: 2023-04-13

## 2023-04-13 RX ORDER — CIPROFLOXACIN LACTATE 400MG/40ML
400 VIAL (ML) INTRAVENOUS EVERY 12 HOURS
Refills: 0 | Status: DISCONTINUED | OUTPATIENT
Start: 2023-04-13 | End: 2023-04-27

## 2023-04-13 RX ADMIN — SODIUM CHLORIDE 1000 MILLILITER(S): 9 INJECTION, SOLUTION INTRAVENOUS at 09:29

## 2023-04-13 RX ADMIN — Medication 200 MILLIGRAM(S): at 08:28

## 2023-04-13 RX ADMIN — Medication 100 MILLIGRAM(S): at 09:24

## 2023-04-13 NOTE — ASU PATIENT PROFILE, ADULT - NSICDXPASTMEDICALHX_GEN_ALL_CORE_FT
PAST MEDICAL HISTORY:  Gallstones 1/2022 treated with amoxicillin 10 days at [place]    GERD (gastroesophageal reflux disease)     HLD (hyperlipidemia)     Peptic ulcer disease

## 2023-04-13 NOTE — ASU DISCHARGE PLAN (ADULT/PEDIATRIC) - NS MD DC FALL RISK RISK
For information on Fall & Injury Prevention, visit: [email or web address] OR  [email or web address] OR  https://www.cdc.gov/steadi/patient.html

## 2023-04-13 NOTE — PRE PROCEDURE NOTE - PRE PROCEDURE EVALUATION
Attending Physician:            NANCY                Procedure: ERCP    Indication for Procedure: CBD stone  ________________________________________________________  PAST MEDICAL & SURGICAL HISTORY:  HLD (hyperlipidemia)      Gallstones  1/2022 treated with amoxicillin 10 days at [place]      Peptic ulcer disease      GERD (gastroesophageal reflux disease)      History of laparoscopic cholecystectomy      History of ERCP        ALLERGIES:  No Known Allergies    HOME MEDICATIONS:    AICD/PPM: [ ] yes   [x ] no    PERTINENT LAB DATA:          NA            PHYSICAL EXAMINATION:    VSS    Constitutional: NAD  HEENT: PERRLA, EOMI,    Neck:  No JVD  Respiratory: CTAB/L  Cardiovascular: S1 and S2  Gastrointestinal: BS+, soft, NT/ND  Extremities: No peripheral edema  Neurological: A/O x 3, no focal deficits  Psychiatric: Normal mood, normal affect  Skin: No rashes    ASA Class: I [ ]  II [x ]  III [ ]  IV [ ]    COMMENTS:    The patient is a suitable candidate for the planned procedure unless box checked [ ]  No, explain:

## 2023-04-13 NOTE — ASU PATIENT PROFILE, ADULT - PATIENT'S HEIGHT AND WEIGHT RECORDED IN THE VITAL SIGNS FLOWSHEET
----- Message from Jeannie Salmon PA-C sent at 10/12/2020  8:49 AM CDT -----  Please advise patient that her urine culture did not show any bacterial growth.   Patient is on Macrobid.   Please obtain an update on patient's condition. If patient is improving, she may complete the course of antibiotic as prescribed.   yes

## 2023-04-13 NOTE — ASU PATIENT PROFILE, ADULT - FALL HARM RISK - TYPE OF ASSESSMENT
CBC CMP WNL. Done for preop for back surgery. To be done at [facility]  Last OV - was planning surgery - I had determined no further cardiac testing required.    May proceed to surgery without further testing. I believe her surgeon is Dr. Quintero.    Please call pt - her labs were normal - I will forward copy to Dr. Quintero Admission

## 2023-04-19 ENCOUNTER — APPOINTMENT (OUTPATIENT)
Dept: GASTROENTEROLOGY | Facility: CLINIC | Age: 48
End: 2023-04-19

## 2024-01-01 NOTE — ED PROVIDER NOTE - CHPI ED SYMPTOMS POS
NAUSEA/PAIN [Sore Throat] : no sore throat [Change in Activity] : no change in activity [Fever Above 102] : no fever [Redness] : no redness [Nasal Stuffiness] : no nasal congestion [Murmur] : no murmur [Constipation] : no constipation [Joint Pains] : no arthralgias [Joint Swelling] : no joint swelling [Sleep Disturbances] : ~T no sleep disturbances

## 2024-07-25 ENCOUNTER — EMERGENCY (EMERGENCY)
Facility: HOSPITAL | Age: 49
LOS: 1 days | Discharge: HOME | End: 2024-07-25
Attending: EMERGENCY MEDICINE | Admitting: EMERGENCY MEDICINE
Payer: COMMERCIAL

## 2024-07-25 VITALS
SYSTOLIC BLOOD PRESSURE: 120 MMHG | HEART RATE: 63 BPM | RESPIRATION RATE: 17 BRPM | DIASTOLIC BLOOD PRESSURE: 69 MMHG | TEMPERATURE: 98 F | OXYGEN SATURATION: 99 %

## 2024-07-25 VITALS
SYSTOLIC BLOOD PRESSURE: 151 MMHG | DIASTOLIC BLOOD PRESSURE: 98 MMHG | WEIGHT: 160.06 LBS | OXYGEN SATURATION: 99 % | RESPIRATION RATE: 16 BRPM | HEART RATE: 83 BPM | TEMPERATURE: 98 F

## 2024-07-25 DIAGNOSIS — Z90.49 ACQUIRED ABSENCE OF OTHER SPECIFIED PARTS OF DIGESTIVE TRACT: Chronic | ICD-10-CM

## 2024-07-25 DIAGNOSIS — Z98.890 OTHER SPECIFIED POSTPROCEDURAL STATES: Chronic | ICD-10-CM

## 2024-07-25 PROBLEM — K21.9 GASTRO-ESOPHAGEAL REFLUX DISEASE WITHOUT ESOPHAGITIS: Chronic | Status: ACTIVE | Noted: 2023-03-31

## 2024-07-25 LAB
ALBUMIN SERPL ELPH-MCNC: 5 G/DL — SIGNIFICANT CHANGE UP (ref 3.3–5)
ALP SERPL-CCNC: 82 U/L — SIGNIFICANT CHANGE UP (ref 40–120)
ALT FLD-CCNC: 35 U/L — SIGNIFICANT CHANGE UP (ref 4–41)
ANION GAP SERPL CALC-SCNC: 15 MMOL/L — HIGH (ref 7–14)
APTT BLD: 40.4 SEC — HIGH (ref 24.5–35.6)
AST SERPL-CCNC: 20 U/L — SIGNIFICANT CHANGE UP (ref 4–40)
BASOPHILS # BLD AUTO: 0.04 K/UL — SIGNIFICANT CHANGE UP (ref 0–0.2)
BASOPHILS NFR BLD AUTO: 0.4 % — SIGNIFICANT CHANGE UP (ref 0–2)
BILIRUB SERPL-MCNC: 0.3 MG/DL — SIGNIFICANT CHANGE UP (ref 0.2–1.2)
BUN SERPL-MCNC: 13 MG/DL — SIGNIFICANT CHANGE UP (ref 7–23)
CALCIUM SERPL-MCNC: 9.8 MG/DL — SIGNIFICANT CHANGE UP (ref 8.4–10.5)
CHLORIDE SERPL-SCNC: 102 MMOL/L — SIGNIFICANT CHANGE UP (ref 98–107)
CO2 SERPL-SCNC: 22 MMOL/L — SIGNIFICANT CHANGE UP (ref 22–31)
CREAT SERPL-MCNC: 0.78 MG/DL — SIGNIFICANT CHANGE UP (ref 0.5–1.3)
EGFR: 111 ML/MIN/1.73M2 — SIGNIFICANT CHANGE UP
EOSINOPHIL # BLD AUTO: 0.04 K/UL — SIGNIFICANT CHANGE UP (ref 0–0.5)
EOSINOPHIL NFR BLD AUTO: 0.4 % — SIGNIFICANT CHANGE UP (ref 0–6)
FLUAV AG NPH QL: SIGNIFICANT CHANGE UP
FLUBV AG NPH QL: SIGNIFICANT CHANGE UP
GLUCOSE SERPL-MCNC: 159 MG/DL — HIGH (ref 70–99)
HCT VFR BLD CALC: 45.4 % — SIGNIFICANT CHANGE UP (ref 39–50)
HGB BLD-MCNC: 16 G/DL — SIGNIFICANT CHANGE UP (ref 13–17)
IANC: 7 K/UL — SIGNIFICANT CHANGE UP (ref 1.8–7.4)
IMM GRANULOCYTES NFR BLD AUTO: 0.4 % — SIGNIFICANT CHANGE UP (ref 0–0.9)
INR BLD: 0.91 RATIO — SIGNIFICANT CHANGE UP (ref 0.85–1.18)
LYMPHOCYTES # BLD AUTO: 1.72 K/UL — SIGNIFICANT CHANGE UP (ref 1–3.3)
LYMPHOCYTES # BLD AUTO: 18.8 % — SIGNIFICANT CHANGE UP (ref 13–44)
MAGNESIUM SERPL-MCNC: 2 MG/DL — SIGNIFICANT CHANGE UP (ref 1.6–2.6)
MCHC RBC-ENTMCNC: 31.2 PG — SIGNIFICANT CHANGE UP (ref 27–34)
MCHC RBC-ENTMCNC: 35.2 GM/DL — SIGNIFICANT CHANGE UP (ref 32–36)
MCV RBC AUTO: 88.5 FL — SIGNIFICANT CHANGE UP (ref 80–100)
MONOCYTES # BLD AUTO: 0.32 K/UL — SIGNIFICANT CHANGE UP (ref 0–0.9)
MONOCYTES NFR BLD AUTO: 3.5 % — SIGNIFICANT CHANGE UP (ref 2–14)
NEUTROPHILS # BLD AUTO: 7 K/UL — SIGNIFICANT CHANGE UP (ref 1.8–7.4)
NEUTROPHILS NFR BLD AUTO: 76.5 % — SIGNIFICANT CHANGE UP (ref 43–77)
NRBC # BLD: 0 /100 WBCS — SIGNIFICANT CHANGE UP (ref 0–0)
NRBC # FLD: 0 K/UL — SIGNIFICANT CHANGE UP (ref 0–0)
PHOSPHATE SERPL-MCNC: 1.7 MG/DL — LOW (ref 2.5–4.5)
PLATELET # BLD AUTO: 302 K/UL — SIGNIFICANT CHANGE UP (ref 150–400)
POTASSIUM SERPL-MCNC: 4 MMOL/L — SIGNIFICANT CHANGE UP (ref 3.5–5.3)
POTASSIUM SERPL-SCNC: 4 MMOL/L — SIGNIFICANT CHANGE UP (ref 3.5–5.3)
PROT SERPL-MCNC: 8 G/DL — SIGNIFICANT CHANGE UP (ref 6–8.3)
PROTHROM AB SERPL-ACNC: 10.3 SEC — SIGNIFICANT CHANGE UP (ref 9.5–13)
RBC # BLD: 5.13 M/UL — SIGNIFICANT CHANGE UP (ref 4.2–5.8)
RBC # FLD: 11.7 % — SIGNIFICANT CHANGE UP (ref 10.3–14.5)
RSV RNA NPH QL NAA+NON-PROBE: SIGNIFICANT CHANGE UP
SARS-COV-2 RNA SPEC QL NAA+PROBE: SIGNIFICANT CHANGE UP
SODIUM SERPL-SCNC: 139 MMOL/L — SIGNIFICANT CHANGE UP (ref 135–145)
TROPONIN T, HIGH SENSITIVITY RESULT: <6 NG/L — SIGNIFICANT CHANGE UP
WBC # BLD: 9.16 K/UL — SIGNIFICANT CHANGE UP (ref 3.8–10.5)
WBC # FLD AUTO: 9.16 K/UL — SIGNIFICANT CHANGE UP (ref 3.8–10.5)

## 2024-07-25 PROCEDURE — 71046 X-RAY EXAM CHEST 2 VIEWS: CPT | Mod: 26

## 2024-07-25 PROCEDURE — 70498 CT ANGIOGRAPHY NECK: CPT | Mod: 26,MC

## 2024-07-25 PROCEDURE — 93010 ELECTROCARDIOGRAM REPORT: CPT

## 2024-07-25 PROCEDURE — 70496 CT ANGIOGRAPHY HEAD: CPT | Mod: 26,MC

## 2024-07-25 PROCEDURE — 99285 EMERGENCY DEPT VISIT HI MDM: CPT

## 2024-07-25 RX ORDER — ACETAMINOPHEN 325 MG
1000 TABLET ORAL ONCE
Refills: 0 | Status: COMPLETED | OUTPATIENT
Start: 2024-07-25 | End: 2024-07-25

## 2024-07-25 RX ORDER — SOD PHOS DI, MONO/K PHOS MONO 250 MG
1 TABLET ORAL ONCE
Refills: 0 | Status: DISCONTINUED | OUTPATIENT
Start: 2024-07-25 | End: 2024-07-29

## 2024-07-25 RX ORDER — KETOROLAC TROMETHAMINE 30 MG/ML
15 INJECTION, SOLUTION INTRAMUSCULAR ONCE
Refills: 0 | Status: DISCONTINUED | OUTPATIENT
Start: 2024-07-25 | End: 2024-07-25

## 2024-07-25 RX ORDER — SODIUM CHLORIDE 0.9 % (FLUSH) 0.9 %
1000 SYRINGE (ML) INJECTION ONCE
Refills: 0 | Status: COMPLETED | OUTPATIENT
Start: 2024-07-25 | End: 2024-07-25

## 2024-07-25 RX ORDER — METOCLOPRAMIDE 5 MG/5ML
10 SOLUTION ORAL ONCE
Refills: 0 | Status: COMPLETED | OUTPATIENT
Start: 2024-07-25 | End: 2024-07-25

## 2024-07-25 RX ADMIN — KETOROLAC TROMETHAMINE 15 MILLIGRAM(S): 30 INJECTION, SOLUTION INTRAMUSCULAR at 15:50

## 2024-07-25 RX ADMIN — Medication 1000 MILLILITER(S): at 13:42

## 2024-07-25 RX ADMIN — Medication 400 MILLIGRAM(S): at 13:41

## 2024-07-25 RX ADMIN — METOCLOPRAMIDE 10 MILLIGRAM(S): 5 SOLUTION ORAL at 13:41

## 2024-07-25 NOTE — ED PROVIDER NOTE - PATIENT PORTAL LINK FT
You can access the FollowMyHealth Patient Portal offered by [facility] by registering at the following website: [email or web address]. By joining [employer]’s FollowMyHealth portal, you will also be able to view your health information using other applications (apps) compatible with our system.

## 2024-07-25 NOTE — ED ADULT TRIAGE NOTE - CHIEF COMPLAINT QUOTE
Pt presents from work history of HTN, presents for ringing to rt ear starting at 0800 this morning, denies headache/dizziness, no slurred speech, no decrease in sensation or strength to all 4 extremities, afebrile.

## 2024-07-25 NOTE — ED PROVIDER NOTE - CLINICAL SUMMARY MEDICAL DECISION MAKING FREE TEXT BOX
48 y/o M with HTN, HLD, PUD p/w lt ear tinnitus that started at 8 AM, and now with newly developing neck pain radiating into the upper back since being triaged, as well as a headache.     No focal neurological symptoms. Neuro exam is benign. Pt is appears uncomfortable. BP elevated 151/98 & 164/102, but HR 83&74    Based on history and normal neurological exam I have low suspicion for intracranial tumor, intracranial bleed, meningitis, temporal arteritis, glaucoma, CO poisoning. However with the sudden on set of sxs ddx includes aneurysm, migraine headache,     Plan: IVF, reglan, acetaminophen IV, labs, EKG, troponin, CXR, CTA head/neck, flu/covid swab

## 2024-07-25 NOTE — ED PROVIDER NOTE - NSFOLLOWUPINSTRUCTIONS_ED_ALL_ED_FT
Show Ucl Units: No Inferior Lateral Orbicularis Oculi Units: 0 Show Additional Area 5: Yes Additional Area 3 Location: right inner brow Detail Level: Zone Glabellar Complex Units: 20 Lot #: [id number] Consent: Written consent obtained. Risks include but not limited to lid/brow ptosis, bruising, swelling, diplopia, temporary effect, incomplete chemical denervation. Expiration Date (Month Year): 04/30/2022 Additional Area 4 Location: lower eye lid Additional Area 1 Location: Nasalis Additional Area 5 Location: upper cheek bone Additional Area 2 Location: Jaw clench Price (Use Numbers Only, No Special Characters Or $): 324 Post-Care Instructions: Patient instructed to not lie down for 4 hours and limit physical activity for 24 hours. Patient instructed not to travel by airplane for 48 hours. Dilution (U/0.1 Cc): 5 Tinnitus  Tinnitus is when you hear a sound that there's no actual source for. It may sound like ringing in your ears or something else. The sound may be loud, soft, or somewhere in between. It can last for a few seconds or be constant for days. It can come and go.    Almost everyone has tinnitus at some point. It's not the same as hearing loss. But you may need to see a health care provider if:  It lasts for a long time.  It comes back often.  You have trouble sleeping and focusing.  What are the causes?  The cause of tinnitus is often unknown. In some cases, you may get it if:  You're around loud noises, such as from machines or music.  An object gets stuck in your ear.  Earwax builds up in your ear.  You drink a lot of alcohol or caffeine.  You take certain medicines.  You start to lose your hearing.  You may also get it from some medical conditions. These may include:  Ear or sinus infections.  Heart diseases.  High blood pressure.  Allergies.  Ménière's disease.  Problems with your thyroid.  A tumor. This is a growth of cells that isn't normal.  A weak, bulging blood vessel called an aneurysm near your ear.  What increases the risk?  You may be more likely to get tinnitus if:  You're around loud noises a lot.  You're older.  You drink alcohol.  You smoke.  What are the signs or symptoms?  The main symptom is hearing a sound that there's no source for. It may sound like ringing. It may also sound like:  Buzzing.  Sizzling.  Blowing air.  Hissing.  Whistling.  Other sounds may include:  Roaring.  Running water.  A musical note.  Tapping.  Humming.  You may have symptoms in one ear or both ears.    How is this diagnosed?  Tinnitus is diagnosed based on your symptoms, your medical history, and an exam. Your provider may do a full hearing test if your tinnitus:  Is in just one ear.  Makes it hard for you to hear.  Lasts 6 months or longer.  You may also need to see an expert in hearing disorders called an audiologist. They may ask you about your symptoms and how tinnitus affects your daily life. You may have other tests done. These may include:  A CT scan.  An MRI.  An angiogram. This shows how blood flows through your blood vessels.  How is this treated?  Treatment may include:  Therapy to help you manage the stress of living with tinnitus.  Finding ways to mask or cover the sound of tinnitus. These include:  Sound or white noise machines.  Devices that fit in your ear and play sounds or music.  A loud humidifier.  Acoustic neural stimulation. This is when you use headphones to listen to music that has a special signal in it. Over time, this signal may change some of the pathways in your brain. This can make you less sensitive to tinnitus. This treatment is used for very severe cases.  Using hearing aids or cochlear implants if your tinnitus is from hearing loss.  If your tinnitus is caused by a medical condition, treating the condition may make it go away.    Follow these instructions at home:  Managing symptoms    Outline of person in bed with head of bed raised.  A person wearing headphones.  Try to avoid being in loud places or around loud noises.  Wear earplugs or headphones when you're around loud noises.  Find ways to reduce stress. These may include meditation, yoga, or deep breathing.  Sleep with your head slightly raised.  General instructions    Take over-the-counter and prescription medicines only as told by your provider.  Track the things that cause symptoms (triggers). Try to avoid these things.  To stop your tinnitus from getting worse:  Do not drink alcohol.  Do not have caffeine.  Do not use any products that contain nicotine or tobacco. These products include cigarettes, chewing tobacco, and vaping devices, such as e-cigarettes. If you need help quitting, ask your provider.  Avoid using too much salt.  Get enough sleep each night.  Where to find more information  American Tinnitus Association: ron.org  Contact a health care provider if:  Your symptoms last for 3 weeks or longer without stopping.  You have sudden hearing loss.  Your symptoms get worse or don't get better with home care.  You can't manage the stress of living with tinnitus.  Get help right away if:  You get tinnitus after a head injury.  You have tinnitus and:  Dizziness.  Nausea and vomiting.  Loss of balance.  A sudden, severe headache.  Changes to your eyesight.  Weakness in your face, arms, or legs.  These symptoms may be an emergency. Get help right away. Call 911.  Do not wait to see if the symptoms will go away.  Do not drive yourself to the hospital.  This information is not intended to replace advice given to you by your health care provider. Make sure you discuss any questions you have with your health care provider.

## 2024-07-25 NOTE — ED PROVIDER NOTE - CONSTITUTIONAL, MLM
normal... awake, alert, oriented to person, place, time/situation and in no apparent distress but appears uncomfortable

## 2024-07-25 NOTE — ED PROVIDER NOTE - NSICDXPASTMEDICALHX_GEN_ALL_CORE_FT
PAST MEDICAL HISTORY:  Gallstones 1/2022 treated with amoxicillin 10 days at [place]    GERD (gastroesophageal reflux disease)     HLD (hyperlipidemia)     Hypertension     Peptic ulcer disease

## 2024-07-25 NOTE — ED PROVIDER NOTE - NSPTACCESSSVCSAPPTDETAILS_ED_ALL_ED_FT
Please help schedule an appointment with ENT, and neurology within 1 week for symptoms of tinnitus ( ringing) in left ear, and headache.

## 2024-07-25 NOTE — ED ADULT NURSE NOTE - OBJECTIVE STATEMENT
Pt c/o ringing in R ear that started when he woke up this morning. Denies any slurred speech, denies dizziness, denies loss of balance. No neuro deficits noted. Phx HTN, HLD. Pt awaiting MD molina. Safety maintained.

## 2024-07-25 NOTE — ED PROVIDER NOTE - PROGRESS NOTE DETAILS
LUÍS Ugarte: Pt states pain has improved a little but still having tinnitus. Discussed CTA negative, EKG ok, CXR normal. Pending lab results. As CTA negative and pt still with pain will give Toradol 15 IV once. LUÍS Leone: Patient states ringing in the ear has resolved, no longer with headache or neck pain/upper back pain.  Discussed with patient's labs were unremarkable aside from a low phosphorus level that was repleted by giving PHOS-NaK.  Discussed that occasionally electrolyte disturbances can cause neurological symptoms vs a complex migraine.  This patient feels well and would like to go home will discharge with follow-up with ENT and neurology.  Patient states will follow-up with his primary care doctor within 1 week.  Discussed strict return precautions and prompt follow-up.  Patient verbalized an understanding and agrees with the plan.  IV removed. LUÍS Leone: Pt states pain has improved a little but still having tinnitus. Discussed CTA negative, EKG ok, CXR normal. Pending lab results. As CTA negative and pt still with pain will give Toradol 15 IV once.

## 2024-07-25 NOTE — ED PROVIDER NOTE - OBJECTIVE STATEMENT
50 y/o M with HTN, HLD, PUD p/w lt ear tinnitus that started at 8 AM, and now with newly developing neck pain radiating into the upper back since being triaged, as well as a headache.     Pt states he has never had sxs like this in the past. Works in [employer], no known ill contacts, or recent travel. No known family hx of stroke, cardiac disease, or aneurysm. No diving/swimming. Pt states he took his antihypertensive this morning - he is unsure of the name.     Denies chills, fevers, double vision, blurry vision, eye pain, photophobia, phonophobia, ear pain, ear discharge, ear swelling, muffled hearing/hearing loss, neck stiffness, slurred speech, facial droop, chest pain, shortness of breath, dyspnea on exertion, abdominal pain, nausea, vomiting, diarrhea, leg pain/swelling, rash, dizziness/lightheadedness, paresthesias, unilateral weakness 48 y/o M with HTN, HLD, PUD p/w lt ear tinnitus that started at 8 AM, and now with newly developing neck pain radiating into the upper back since being triaged, as well as a headache.     Pt states he has never had sxs like this in the past. Works in [employer], no known ill contacts, or recent travel. No known family hx of stroke, cardiac disease, or aneurysm. No diving/swimming. Pt states he took his antihypertensive this morning - he is unsure of the name.     Denies chills, fevers, double vision, blurry vision, eye pain, photophobia, phonophobia, ear pain, ear discharge, ear swelling, muffled hearing/hearing loss, neck stiffness, slurred speech, facial droop, chest pain, shortness of breath, dyspnea on exertion, abdominal pain, nausea, vomiting, diarrhea, leg pain/swelling, rash, dizziness/lightheadedness, paresthesias, unilateral weakness    Attendin-year-old male presents with ringing in the right ear and associated with pain that radiates down the right side of the neck.  This occurred when he was at work earlier today.  There is no weakness in the arms of the legs.  It is associated with headache.  There is no vomiting.  No fever

## 2024-09-16 NOTE — ED ADULT NURSE NOTE - NSICDXPASTSURGICALHX_GEN_ALL_CORE_FT
BP goal is < 140/90.   Tolerating  ARB  Blood pressure goals discussed with patient     PAST SURGICAL HISTORY:  No significant past surgical history

## 2024-11-19 NOTE — ED PROVIDER NOTE - INTERNATIONAL TRAVEL
Magnesium oxide 240 mg  Gabapentin 800 mg  Ritalin 20 mg  Last Appt:  11/4/2024  Next Appt:   12/19/2024  Med verified in Epic     
No

## 2025-07-07 NOTE — PRE-OP CHECKLIST - HAIR REMOVAL
Ambulatory Care Coordination Note     7/7/2025 2:29 PM     Patient outreach attempt by this ACM today to perform care management follow up . ACM was unable to reach the patient by telephone today;   left voice message requesting a return phone call to this ACM.     ACM: Arianna Bocanegra, RN     Care Summary Note:     Roxana has:   COPD, recent DX- pneumonia- uses Oxygen at 2 L at night and prn, nebulizer, medications      Plan of Care : Continue assessments, education, and support                         SDOH completed                          Does she monitor any vitals at home?, Discuss RPM                           Medication reviewed 6/17/2025 4/24/2025- Mailed [facility]- [facility], [facility], My Chart Scheduling and COPD and Pneumonia Zone Tools                           F/U on oxygen therapy, initial assessment, COPD assessment, HC decision maker     Offered patient enrollment in the Remote Patient Monitoring (RPM) program for in-home monitoring: Deferred at this time because time; will discuss at next outreach.     7/7/2025- 2:30 pm  Left HIPAA compliant voice message requesting return call @ [phone].       PCP/Specialist follow up:   Future Appointments         Provider Specialty Dept Phone    7/31/2025 11:20 AM Sekou Marquez APRN - CNP Family Medicine [phone]    8/4/2025 9:00 AM Sekou Marquez APRN - CNP Family Medicine [phone]            Follow Up:   Plan for next AC outreach in approximately 1 week to complete:  - education .             
hair removal not indicated

## (undated) DEVICE — DENTURE CUP PINK

## (undated) DEVICE — BITE BLOCK ADULT 20 X 27MM (GREEN)

## (undated) DEVICE — TROCAR COVIDIEN VERSAONE BLUNT TIP HASSAN 12MM

## (undated) DEVICE — TUBING HYDRO-SURG PLUS IRRIGATOR W SMOKEVAC & PROBE

## (undated) DEVICE — DRSG CURITY GAUZE SPONGE 4 X 4" 12-PLY NON-STERILE

## (undated) DEVICE — SALIVA EJECTOR (BLUE)

## (undated) DEVICE — OLYMPUS DISTAL COVER ENDOSCOPE

## (undated) DEVICE — INJ SYS RAP REFIL

## (undated) DEVICE — BASIN EMESIS 10IN GRADUATED MAUVE

## (undated) DEVICE — PACK IV START WITH CHG

## (undated) DEVICE — OMNIPAQUE 300  30ML

## (undated) DEVICE — GOWN LG

## (undated) DEVICE — SOL INJ NS 0.9% 500ML 1-PORT

## (undated) DEVICE — SOL IRR POUR NS 0.9% 500ML

## (undated) DEVICE — SENSOR O2 FINGER ADULT

## (undated) DEVICE — LOK DVC RX AND BIOPSY

## (undated) DEVICE — TROCAR COVIDIEN VERSAONE FIXATION CANNULA 5MM

## (undated) DEVICE — TUBING SUCTION NONCONDUCTIVE 6MM X 12FT

## (undated) DEVICE — UNDERPAD LINEN SAVER 17 X 24"

## (undated) DEVICE — SYR LUER LOK 3CC

## (undated) DEVICE — NDL HYPO SAFE 22G X 1" (BLACK)

## (undated) DEVICE — D HELP - CLEARVIEW CLEARIFY SYSTEM

## (undated) DEVICE — DRSG BANDAID 0.75X3"

## (undated) DEVICE — SYR LUER LOK 10CC

## (undated) DEVICE — ESU ERBE 044850: Type: DURABLE MEDICAL EQUIPMENT

## (undated) DEVICE — ELCTR ECG CONDUCTIVE ADHESIVE

## (undated) DEVICE — LINE BREATHE SAMPLNG

## (undated) DEVICE — SNARE DISP

## (undated) DEVICE — ENDOCATCH 10MM SPECIMEN POUCH

## (undated) DEVICE — ORGANIZER MIO MEDICAL DEVICE DISP

## (undated) DEVICE — CATH IV SAFE BC 22G X 1" (BLUE)

## (undated) DEVICE — DRSG 2X2

## (undated) DEVICE — TROCAR COVIDIEN VERSAONE BLADELESS FIXATION 11MM STANDARD